# Patient Record
Sex: FEMALE | Race: BLACK OR AFRICAN AMERICAN | NOT HISPANIC OR LATINO | Employment: PART TIME | URBAN - METROPOLITAN AREA
[De-identification: names, ages, dates, MRNs, and addresses within clinical notes are randomized per-mention and may not be internally consistent; named-entity substitution may affect disease eponyms.]

---
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
## 2022-01-28 ENCOUNTER — LAB VISIT (OUTPATIENT)
Dept: LAB | Facility: HOSPITAL | Age: 22
End: 2022-01-28
Attending: NURSE PRACTITIONER
Payer: COMMERCIAL

## 2022-01-28 ENCOUNTER — OFFICE VISIT (OUTPATIENT)
Dept: FAMILY MEDICINE | Facility: CLINIC | Age: 22
End: 2022-01-28
Payer: COMMERCIAL

## 2022-01-28 VITALS
SYSTOLIC BLOOD PRESSURE: 118 MMHG | WEIGHT: 119.94 LBS | OXYGEN SATURATION: 99 % | DIASTOLIC BLOOD PRESSURE: 78 MMHG | HEART RATE: 60 BPM

## 2022-01-28 DIAGNOSIS — R10.32 LEFT LOWER QUADRANT PAIN: ICD-10-CM

## 2022-01-28 LAB
ALBUMIN SERPL BCP-MCNC: 4.2 G/DL (ref 3.5–5.2)
ALP SERPL-CCNC: 40 U/L (ref 55–135)
ALT SERPL W/O P-5'-P-CCNC: 17 U/L (ref 10–44)
AMYLASE SERPL-CCNC: 142 U/L (ref 20–110)
ANION GAP SERPL CALC-SCNC: 9 MMOL/L (ref 8–16)
AST SERPL-CCNC: 32 U/L (ref 10–40)
BASOPHILS # BLD AUTO: 0.02 K/UL (ref 0–0.2)
BASOPHILS NFR BLD: 0.4 % (ref 0–1.9)
BILIRUB SERPL-MCNC: 0.4 MG/DL (ref 0.1–1)
BUN SERPL-MCNC: 13 MG/DL (ref 6–20)
CALCIUM SERPL-MCNC: 9.9 MG/DL (ref 8.7–10.5)
CHLORIDE SERPL-SCNC: 106 MMOL/L (ref 95–110)
CO2 SERPL-SCNC: 22 MMOL/L (ref 23–29)
CREAT SERPL-MCNC: 0.9 MG/DL (ref 0.5–1.4)
DIFFERENTIAL METHOD: NORMAL
EOSINOPHIL # BLD AUTO: 0.1 K/UL (ref 0–0.5)
EOSINOPHIL NFR BLD: 1.2 % (ref 0–8)
ERYTHROCYTE [DISTWIDTH] IN BLOOD BY AUTOMATED COUNT: 11.9 % (ref 11.5–14.5)
EST. GFR  (AFRICAN AMERICAN): >60 ML/MIN/1.73 M^2
EST. GFR  (NON AFRICAN AMERICAN): >60 ML/MIN/1.73 M^2
GLUCOSE SERPL-MCNC: 80 MG/DL (ref 70–110)
HCT VFR BLD AUTO: 42 % (ref 37–48.5)
HGB BLD-MCNC: 13.5 G/DL (ref 12–16)
IMM GRANULOCYTES # BLD AUTO: 0.01 K/UL (ref 0–0.04)
IMM GRANULOCYTES NFR BLD AUTO: 0.2 % (ref 0–0.5)
LIPASE SERPL-CCNC: 29 U/L (ref 4–60)
LYMPHOCYTES # BLD AUTO: 2.3 K/UL (ref 1–4.8)
LYMPHOCYTES NFR BLD: 48 % (ref 18–48)
MCH RBC QN AUTO: 29.3 PG (ref 27–31)
MCHC RBC AUTO-ENTMCNC: 32.1 G/DL (ref 32–36)
MCV RBC AUTO: 91 FL (ref 82–98)
MONOCYTES # BLD AUTO: 0.5 K/UL (ref 0.3–1)
MONOCYTES NFR BLD: 9.4 % (ref 4–15)
NEUTROPHILS # BLD AUTO: 2 K/UL (ref 1.8–7.7)
NEUTROPHILS NFR BLD: 40.8 % (ref 38–73)
NRBC BLD-RTO: 0 /100 WBC
PLATELET # BLD AUTO: 291 K/UL (ref 150–450)
PMV BLD AUTO: 10.7 FL (ref 9.2–12.9)
POTASSIUM SERPL-SCNC: 4.6 MMOL/L (ref 3.5–5.1)
PROT SERPL-MCNC: 8.1 G/DL (ref 6–8.4)
RBC # BLD AUTO: 4.61 M/UL (ref 4–5.4)
SODIUM SERPL-SCNC: 137 MMOL/L (ref 136–145)
WBC # BLD AUTO: 4.88 K/UL (ref 3.9–12.7)

## 2022-01-28 PROCEDURE — 99204 PR OFFICE/OUTPT VISIT, NEW, LEVL IV, 45-59 MIN: ICD-10-PCS | Mod: S$GLB,,, | Performed by: NURSE PRACTITIONER

## 2022-01-28 PROCEDURE — 3074F PR MOST RECENT SYSTOLIC BLOOD PRESSURE < 130 MM HG: ICD-10-PCS | Mod: CPTII,S$GLB,, | Performed by: NURSE PRACTITIONER

## 2022-01-28 PROCEDURE — 3078F PR MOST RECENT DIASTOLIC BLOOD PRESSURE < 80 MM HG: ICD-10-PCS | Mod: CPTII,S$GLB,, | Performed by: NURSE PRACTITIONER

## 2022-01-28 PROCEDURE — 1160F PR REVIEW ALL MEDS BY PRESCRIBER/CLIN PHARMACIST DOCUMENTED: ICD-10-PCS | Mod: CPTII,S$GLB,, | Performed by: NURSE PRACTITIONER

## 2022-01-28 PROCEDURE — 99999 PR PBB SHADOW E&M-NEW PATIENT-LVL III: CPT | Mod: PBBFAC,,, | Performed by: NURSE PRACTITIONER

## 2022-01-28 PROCEDURE — 3078F DIAST BP <80 MM HG: CPT | Mod: CPTII,S$GLB,, | Performed by: NURSE PRACTITIONER

## 2022-01-28 PROCEDURE — 1160F RVW MEDS BY RX/DR IN RCRD: CPT | Mod: CPTII,S$GLB,, | Performed by: NURSE PRACTITIONER

## 2022-01-28 PROCEDURE — 36415 COLL VENOUS BLD VENIPUNCTURE: CPT | Mod: PO | Performed by: NURSE PRACTITIONER

## 2022-01-28 PROCEDURE — 99999 PR PBB SHADOW E&M-NEW PATIENT-LVL III: ICD-10-PCS | Mod: PBBFAC,,, | Performed by: NURSE PRACTITIONER

## 2022-01-28 PROCEDURE — 3074F SYST BP LT 130 MM HG: CPT | Mod: CPTII,S$GLB,, | Performed by: NURSE PRACTITIONER

## 2022-01-28 PROCEDURE — 82150 ASSAY OF AMYLASE: CPT | Performed by: NURSE PRACTITIONER

## 2022-01-28 PROCEDURE — 80053 COMPREHEN METABOLIC PANEL: CPT | Performed by: NURSE PRACTITIONER

## 2022-01-28 PROCEDURE — 1159F PR MEDICATION LIST DOCUMENTED IN MEDICAL RECORD: ICD-10-PCS | Mod: CPTII,S$GLB,, | Performed by: NURSE PRACTITIONER

## 2022-01-28 PROCEDURE — 83690 ASSAY OF LIPASE: CPT | Performed by: NURSE PRACTITIONER

## 2022-01-28 PROCEDURE — 99204 OFFICE O/P NEW MOD 45 MIN: CPT | Mod: S$GLB,,, | Performed by: NURSE PRACTITIONER

## 2022-01-28 PROCEDURE — 85025 COMPLETE CBC W/AUTO DIFF WBC: CPT | Performed by: NURSE PRACTITIONER

## 2022-01-28 PROCEDURE — 1159F MED LIST DOCD IN RCRD: CPT | Mod: CPTII,S$GLB,, | Performed by: NURSE PRACTITIONER

## 2022-01-28 RX ORDER — DESOGESTREL AND ETHINYL ESTRADIOL 21-5 (28)
1 KIT ORAL DAILY
Status: ON HOLD | COMMUNITY
End: 2023-10-17 | Stop reason: CLARIF

## 2022-01-28 NOTE — PROGRESS NOTES
Subjective:       Patient ID: Devan Selby is a 21 y.o. female.    Chief Complaint: Abdominal Pain    HPI   New patient to clinic without significant medical history presents for persistent moderate LUQ and kay (L>R) abdominal pain  No pain currently   Onset roughly 2-3 months ago   Waves of abdominal pain/discomfort lasting hours at a time, occurring a few days to a week at a time then spontaneously resolves  Pain occurs mostly at night  During episodes she will having bloating and nausea   Denies RUQ pain    Cannot identify trigger. Tried cutting gluten, caffeine, meat --no improvement   On OCP cycles normal. No spotting or vaginal complaints   Bowel movements normal. No association with symptoms. Denies dark or bloody stools  Denies regurgitation, heartburn, or belching   Denies urinary symptoms   Denies anxiety or stress    Vitals:    01/28/22 1419   BP: 118/78   Pulse: 60     Review of Systems   Constitutional: Negative for chills, diaphoresis and fever.   HENT: Negative for facial swelling and trouble swallowing.    Respiratory: Negative for cough and shortness of breath.    Cardiovascular: Negative for chest pain.   Gastrointestinal: Positive for abdominal distention, abdominal pain and nausea. Negative for anal bleeding, blood in stool, constipation, diarrhea and vomiting.   Genitourinary: Negative for difficulty urinating, dysuria, frequency, menstrual problem, pelvic pain, vaginal bleeding and vaginal pain.   Musculoskeletal: Negative for gait problem.   Skin: Negative for pallor and rash.   Neurological: Negative for speech difficulty.   Psychiatric/Behavioral: Negative for confusion. The patient is not nervous/anxious.        No past medical history on file.  Objective:      Physical Exam  Vitals and nursing note reviewed.   Constitutional:       General: She is not in acute distress.     Appearance: She is not diaphoretic.   HENT:      Head: Normocephalic.   Eyes:      General:         Right eye: No  discharge.         Left eye: No discharge.   Neck:      Trachea: No tracheal deviation.   Cardiovascular:      Rate and Rhythm: Normal rate.      Heart sounds: Normal heart sounds.   Pulmonary:      Effort: Pulmonary effort is normal.      Breath sounds: Normal breath sounds.   Abdominal:      General: There is no distension.      Palpations: Abdomen is soft.      Tenderness: There is no abdominal tenderness. There is no right CVA tenderness or left CVA tenderness.   Skin:     Coloration: Skin is not pale.   Neurological:      Mental Status: She is alert and oriented to person, place, and time.   Psychiatric:         Speech: Speech normal.         Behavior: Behavior normal.         Thought Content: Thought content normal.         Judgment: Judgment normal.         Assessment:       1. Left lower quadrant pain        Plan:       Left lower quadrant pain  -     CBC Auto Differential; Future; Expected date: 01/28/2022  -     Comprehensive Metabolic Panel; Future; Expected date: 01/28/2022  -     Amylase; Future; Expected date: 01/28/2022  -     Lipase; Future; Expected date: 01/28/2022  -     CT Abdomen Pelvis With Contrast; Future; Expected date: 01/28/2022        FU with GI if no improvement

## 2022-01-30 DIAGNOSIS — R10.32 LEFT LOWER QUADRANT PAIN: Primary | ICD-10-CM

## 2022-01-30 DIAGNOSIS — R74.8 ELEVATED AMYLASE: ICD-10-CM

## 2022-02-02 ENCOUNTER — TELEPHONE (OUTPATIENT)
Dept: FAMILY MEDICINE | Facility: CLINIC | Age: 22
End: 2022-02-02
Payer: COMMERCIAL

## 2022-02-02 NOTE — TELEPHONE ENCOUNTER
Left a message for pt to call back so we can get her scheduled for GI appt        ----- Message from Silke Clark NP sent at 1/30/2022  2:21 PM CST -----  Please schedule GI appointment after CT

## 2022-02-02 NOTE — TELEPHONE ENCOUNTER
Called pt and scheduled an appt with GI on monday 2/21. She can only do Mondays      ----- Message from Silke Clark NP sent at 1/30/2022  2:21 PM CST -----  Please schedule GI appointment after CT

## 2022-02-06 ENCOUNTER — TELEPHONE (OUTPATIENT)
Dept: FAMILY MEDICINE | Facility: CLINIC | Age: 22
End: 2022-02-06
Payer: COMMERCIAL

## 2022-02-06 NOTE — TELEPHONE ENCOUNTER
----- Message from Silke Clark NP sent at 1/30/2022  2:21 PM CST -----  Please schedule GI appointment after CT

## 2022-02-14 ENCOUNTER — OFFICE VISIT (OUTPATIENT)
Dept: GASTROENTEROLOGY | Facility: CLINIC | Age: 22
End: 2022-02-14
Payer: COMMERCIAL

## 2022-02-14 ENCOUNTER — TELEPHONE (OUTPATIENT)
Dept: GASTROENTEROLOGY | Facility: CLINIC | Age: 22
End: 2022-02-14
Payer: COMMERCIAL

## 2022-02-14 ENCOUNTER — HOSPITAL ENCOUNTER (OUTPATIENT)
Dept: RADIOLOGY | Facility: HOSPITAL | Age: 22
Discharge: HOME | End: 2022-02-14
Attending: NURSE PRACTITIONER
Payer: COMMERCIAL

## 2022-02-14 VITALS
HEART RATE: 62 BPM | BODY MASS INDEX: 19.13 KG/M2 | DIASTOLIC BLOOD PRESSURE: 81 MMHG | WEIGHT: 119.06 LBS | SYSTOLIC BLOOD PRESSURE: 120 MMHG | HEIGHT: 66 IN

## 2022-02-14 DIAGNOSIS — R09.A2 GLOBUS SENSATION: ICD-10-CM

## 2022-02-14 DIAGNOSIS — R14.0 BLOATING: ICD-10-CM

## 2022-02-14 DIAGNOSIS — R10.32 LEFT LOWER QUADRANT PAIN: ICD-10-CM

## 2022-02-14 DIAGNOSIS — R10.13 EPIGASTRIC PAIN: ICD-10-CM

## 2022-02-14 DIAGNOSIS — K21.9 GASTROESOPHAGEAL REFLUX DISEASE, UNSPECIFIED WHETHER ESOPHAGITIS PRESENT: Primary | ICD-10-CM

## 2022-02-14 DIAGNOSIS — K57.90 DIVERTICULOSIS: ICD-10-CM

## 2022-02-14 DIAGNOSIS — R11.0 NAUSEA: ICD-10-CM

## 2022-02-14 DIAGNOSIS — R12 HEARTBURN: ICD-10-CM

## 2022-02-14 PROCEDURE — A9698 NON-RAD CONTRAST MATERIALNOC: HCPCS | Mod: PO | Performed by: NURSE PRACTITIONER

## 2022-02-14 PROCEDURE — 3008F PR BODY MASS INDEX (BMI) DOCUMENTED: ICD-10-PCS | Mod: CPTII,S$GLB,,

## 2022-02-14 PROCEDURE — 99204 OFFICE O/P NEW MOD 45 MIN: CPT | Mod: S$GLB,,,

## 2022-02-14 PROCEDURE — 25500020 PHARM REV CODE 255: Mod: PO | Performed by: NURSE PRACTITIONER

## 2022-02-14 PROCEDURE — 74177 CT ABDOMEN PELVIS WITH CONTRAST: ICD-10-PCS | Mod: 26,,, | Performed by: RADIOLOGY

## 2022-02-14 PROCEDURE — 74177 CT ABD & PELVIS W/CONTRAST: CPT | Mod: TC,PO

## 2022-02-14 PROCEDURE — 1160F PR REVIEW ALL MEDS BY PRESCRIBER/CLIN PHARMACIST DOCUMENTED: ICD-10-PCS | Mod: CPTII,S$GLB,,

## 2022-02-14 PROCEDURE — 99999 PR PBB SHADOW E&M-EST. PATIENT-LVL III: CPT | Mod: PBBFAC,,,

## 2022-02-14 PROCEDURE — 74177 CT ABD & PELVIS W/CONTRAST: CPT | Mod: 26,,, | Performed by: RADIOLOGY

## 2022-02-14 PROCEDURE — 3079F PR MOST RECENT DIASTOLIC BLOOD PRESSURE 80-89 MM HG: ICD-10-PCS | Mod: CPTII,S$GLB,,

## 2022-02-14 PROCEDURE — 3079F DIAST BP 80-89 MM HG: CPT | Mod: CPTII,S$GLB,,

## 2022-02-14 PROCEDURE — 1159F PR MEDICATION LIST DOCUMENTED IN MEDICAL RECORD: ICD-10-PCS | Mod: CPTII,S$GLB,,

## 2022-02-14 PROCEDURE — 99204 PR OFFICE/OUTPT VISIT, NEW, LEVL IV, 45-59 MIN: ICD-10-PCS | Mod: S$GLB,,,

## 2022-02-14 PROCEDURE — 3074F SYST BP LT 130 MM HG: CPT | Mod: CPTII,S$GLB,,

## 2022-02-14 PROCEDURE — 3008F BODY MASS INDEX DOCD: CPT | Mod: CPTII,S$GLB,,

## 2022-02-14 PROCEDURE — 1159F MED LIST DOCD IN RCRD: CPT | Mod: CPTII,S$GLB,,

## 2022-02-14 PROCEDURE — 99999 PR PBB SHADOW E&M-EST. PATIENT-LVL III: ICD-10-PCS | Mod: PBBFAC,,,

## 2022-02-14 PROCEDURE — 1160F RVW MEDS BY RX/DR IN RCRD: CPT | Mod: CPTII,S$GLB,,

## 2022-02-14 PROCEDURE — 3074F PR MOST RECENT SYSTOLIC BLOOD PRESSURE < 130 MM HG: ICD-10-PCS | Mod: CPTII,S$GLB,,

## 2022-02-14 RX ORDER — OMEPRAZOLE 40 MG/1
40 CAPSULE, DELAYED RELEASE ORAL DAILY
Qty: 30 CAPSULE | Refills: 1 | Status: SHIPPED | OUTPATIENT
Start: 2022-02-14 | End: 2022-06-06 | Stop reason: DRUGHIGH

## 2022-02-14 RX ADMIN — IOHEXOL 1000 ML: 9 SOLUTION ORAL at 11:02

## 2022-02-14 RX ADMIN — IOHEXOL 75 ML: 350 INJECTION, SOLUTION INTRAVENOUS at 11:02

## 2022-02-14 NOTE — PATIENT INSTRUCTIONS
Patient Education       Upper GI Endoscopy   Why is this procedure done?   This procedure is done to view your upper gastrointestinal (GI) tract. This includes your throat and food pipe (esophagus). It also includes your stomach and the first part of the small bowel. Some people have this test for problems like coughing or throwing up blood. Other people may be having bad belly pain or blood in their stool. You may be having trouble swallowing or problems with acid reflux.  Doctors often use this test to look for problems like:  · Ulcers  · Cancer or tumor growths  · Internal bleeding  · Swelling  · Inflammation  · Infection  · Mustafa's esophagus  · Gastroesophageal reflux disease or GERD  · Swallowing problems     What will the results be?   Your doctor may find the problem inside your body that is causing your signs. The doctor can also treat some problems while doing this procedure. This may include things like stopping bleeding or removing a growth.  What happens before the procedure?   Your doctor will take your history and do an exam. Talk to the doctor about:  · All the drugs you are taking. Be sure to include all prescription, over the counter, vitamins, and herbal supplements. Bring a list of drugs you take with you.  · Tell the doctor if you have any drug allergy.  · Any bleeding problems. Be sure to tell your doctor if you are taking any drugs that may cause bleeding. Some of these are warfarin, rivaroxaban, apixaban, ticagrelor, clopidogrel, ketorolac, ibuprofen, naproxen, or aspirin. Certain vitamins and herbs, such as garlic and fish oil, may also add to the risk for bleeding. You may need to stop these drugs as well. Talk to your doctor about them.  · When you need to stop eating or drinking before your procedure.  You will not be allowed to drive right away after the procedure. Ask a family member or a friend to drive you home.  What happens during the procedure?   · Once you are in the operating  room, the staff will put an IV in your arm to give you fluids and drugs. You will be given a drug to make you sleepy. It will also help you stay pain free during the surgery.  · Your doctor may spray a drug in your throat to numb the area.  · You will be asked to lie on your left side. The staff may put a small tube in your nose to help you breathe. Your doctor may place a tool in your mouth to keep it open during the procedure. The staff may place a suction tool in your mouth to lessen saliva flow.  · The doctor will put a special scope in your mouth and down your food pipe. It is a long, thin tube with lights and a small camera. It sends images to a screen in the operating room where the camera is being used.  · To be able to view the site clearly, gas will be pumped into your belly.  · Your doctor will use the scope to see if there are problems in your upper GI tract. Small tools may be used with the scope to fix any problems that are found. Your doctor may stop an area of bleeding or take out a tumor. The doctor may also remove a growth or take tissue samples for biopsy.  · This procedure takes about 15 to 30 minutes.  What happens after the procedure?   · You will go to the Recovery Room and the staff will watch you closely.  · You will be allowed to go home when you are awake and able to eat and drink.  · You may feel bloated after the procedure. This is from any gas the doctor may have used to help see your GI tract better.  · You may have a sore throat after the procedure. You can drink fluid once the numbing drugs in your throat wear off.  · Ask your doctor when the results will be available. Set up a visit to talk about them.  What drugs may be needed?   The doctor may order drugs to:  · Help with pain  · Decrease the acid in your stomach  What problems could happen?   · Painful swallowing  · Upset stomach  · Injury to food pipe   · Throwing up  · Tear in the esophagus  Where can I learn more?   American  "College of Gastroenterology  https://gi.org/topics/upper-gi-endoscopy-egd/   Last Reviewed Date   2021-10-05  Consumer Information Use and Disclaimer   This information is not specific medical advice and does not replace information you receive from your health care provider. This is only a brief summary of general information. It does NOT include all information about conditions, illnesses, injuries, tests, procedures, treatments, therapies, discharge instructions or life-style choices that may apply to you. You must talk with your health care provider for complete information about your health and treatment options. This information should not be used to decide whether or not to accept your health care providers advice, instructions or recommendations. Only your health care provider has the knowledge and training to provide advice that is right for you.  Copyright   Copyright © 2021 UpToDate, Inc. and its affiliates and/or licensors. All rights reserved.  Patient Education       Acid Reflux and Gastroesophageal Reflux Disease in Adults   The Basics   Written by the doctors and editors at [employer]   What is acid reflux? -- Acid reflux is when the acid that is normally in your stomach backs up into the esophagus. The esophagus is the tube that carries food from your mouth to your stomach (figure 1).  When acid reflux causes bothersome symptoms or damage, doctors call it "gastroesophageal reflux disease" or "GERD."  What are the symptoms of acid reflux? -- The most common symptoms are:  · Heartburn, which is a burning feeling in the chest  · Regurgitation, which is when acid and undigested food flow back into your throat or mouth   Other symptoms might include:  · Stomach or chest pain  · Trouble swallowing  · Having a raspy voice or a sore throat  · Unexplained cough  · Nausea or vomiting  Is there anything I can do on my own to feel better? -- Yes. You might feel better if you:  · Lose weight (if you are " overweight)  · Raise the head of your bed by 6 to 8 inches - You can do this by putting blocks of wood or rubber under 2 legs of the bed or a foam wedge under the mattress.  · Avoid foods that make your symptoms worse - For some people these include coffee, chocolate, alcohol, peppermint, and fatty foods.  · Stop smoking, if you smoke  · Avoid late meals - Lying down with a full stomach can make reflux worse. Try to plan meals for at least 2 to 3 hours before bedtime.  · Avoid tight clothing - Some people feel better if they wear comfortable clothing that does not squeeze the stomach area.   How is acid reflux treated? -- There are a few main types of medicines that can help with the symptoms of acid reflux. The most common are antacids, histamine blockers, and proton pump inhibitors (table 1). All of these medicines work by reducing or blocking stomach acid. But they each do that in a different way.  · For mild symptoms, antacids can help, but they work only for a short time. Histamine blockers are stronger and last longer than antacids. You can buy antacids and most histamine blockers without a prescription.  · For frequent and more severe symptoms, proton pump inhibitors are the most effective medicines. Some of these medicines are sold without a prescription. But there are other versions that your doctor can prescribe.  Sometimes, medicines cost less if you get them with a doctor's prescription. Other times, non-prescription medicines cost less. If you are worried about cost, ask your pharmacist about ways to pay less for your medicines.  Should I see a doctor or nurse about my acid reflux? -- Some people can manage their acid reflux on their own by changing their habits or taking non-prescription medicines. But you should see a doctor or nurse if:  · Your symptoms are severe or last a long time  · You cannot seem to control your symptoms  · You have had symptoms for many years  You should also see a doctor or  "nurse right away if you:  · Have trouble swallowing, or feel as though food gets "stuck" on the way down  · Lose weight when you are not trying to  · Have chest pain  · Choke when you eat  · Vomit blood or have bowel movements that are red, black, or look like tar  What if my child or teenager has acid reflux? -- If your child or teenager has acid reflux, take him or her to see a doctor or nurse. Do not give your child medicines to treat acid reflux without talking to a doctor or nurse.  In children, acid reflux can be caused by a number of problems. It's important to have a doctor or nurse check for these problems before trying any treatments.  All topics are updated as new evidence becomes available and our peer review process is complete.  This topic retrieved from [employer] on: Sep 21, 2021.  Topic 39175 Version 11.0  Release: 29.4.2 - C29.263  © 2021 UpToDate, Inc. and/or its affiliates. All rights reserved.  figure 1: Upper digestive tract     The upper digestive tract includes the esophagus (the tube that connects the mouth to the stomach), the stomach, and the duodenum (the first part of the small intestine).  Graphic 70034 Version 6.0    table 1: Medicines used to reduce stomach acid  Medicine type  Medicine name examples    Antacids* Calcium carbonate (sample brand names: Maalox, Tums)    Aluminum hydroxide, magnesium hydroxide, and simethicone (sample brand name: Mylanta)   Surface agents Sucralfate (brand name: Carafate)   Histamine blockers¶  Famotidine (brand name: Pepcid)    Cimetidine (brand name: Tagamet)   Proton pump inhibitors Omeprazole (brand name: Prilosec)    Esomeprazole (brand name: Nexium)    Pantoprazole (brand name: Protonix)    Lansoprazole (brand name: Prevacid)    Dexlansoprazole (brand name: Dexilant)    Rabeprazole (brand name: AcipHex)   Graphic 47604 Version 14.0  Consumer Information Use and Disclaimer   This information is not specific medical advice and does not replace " information you receive from your health care provider. This is only a brief summary of general information. It does NOT include all information about conditions, illnesses, injuries, tests, procedures, treatments, therapies, discharge instructions or life-style choices that may apply to you. You must talk with your health care provider for complete information about your health and treatment options. This information should not be used to decide whether or not to accept your health care provider's advice, instructions or recommendations. Only your health care provider has the knowledge and training to provide advice that is right for you. The use of this information is governed by the [employer] End User License Agreement, available at https://www.[employer]/en/solutions/[employer]/about/maty.The use of [employer] content is governed by the [employer] Terms of Use. ©2021 [employer] All rights reserved.  Copyright   © 2021 UpToDate, Inc. and/or its affiliates. All rights reserved.

## 2022-02-14 NOTE — TELEPHONE ENCOUNTER
----- Message from Frances Cameron sent at 2/14/2022 11:53 AM CST -----  Type: Needs Medical Advice  Who Called:  Pt Mother  Best Call Back Number: [phone]  Additional Information: Pt has appt on 02/21 but is in pain and in town today and requesting to be seen today--Please advise--thank you

## 2022-02-14 NOTE — H&P (VIEW-ONLY)
Subjective:       Patient ID: Devan Selby is a 21 y.o. female Body mass index is 19.21 kg/m².    Chief Complaint: Abdominal Pain    This patient is new to me.     Abdominal Pain  This is a recurrent problem. Episode onset: started around Thanksgiving 2021. The onset quality is undetermined. The problem occurs every several days. The problem has been waxing and waning. The pain is located in the epigastric region. The pain is at a severity of 0/10 (rated pain 6-7/10 during episodes). The quality of the pain is dull and sharp. The abdominal pain radiates to the LLQ. Associated symptoms include nausea (mild relief after taking OTC TUMs). Pertinent negatives include no anorexia, belching, constipation, diarrhea, fever, flatus, frequency, headaches, hematochezia, hematuria, melena, vomiting or weight loss. Associated symptoms comments: Also reports bloating. The pain is aggravated by eating and certain positions (worse after eat fried foods in the past; laying on left side worsens symptoms; reports changing diet and avoiding fried foods, gluten, or triggering foods did not improve symptoms). She has tried antacids (past treatment: OTC TUMs PRN) for the symptoms. The treatment provided mild relief. Her past medical history is significant for GERD (reports intermittent episodes of globus sensation and heartburn 3 times a week). There is no history of abdominal surgery, colon cancer, Crohn's disease, gallstones, irritable bowel syndrome, pancreatitis, PUD or ulcerative colitis. Patient's medical history does not include kidney stones and UTI.     Review of Systems   Constitutional: Negative for activity change, appetite change, chills, diaphoresis, fatigue, fever, unexpected weight change and weight loss.   HENT: Negative for sore throat and trouble swallowing.    Respiratory: Negative for cough, choking and shortness of breath.    Cardiovascular: Negative for chest pain.   Gastrointestinal: Positive for abdominal  distention, abdominal pain and nausea (mild relief after taking OTC TUMs). Negative for anal bleeding, anorexia, blood in stool, constipation, diarrhea, flatus, hematochezia, melena, rectal pain and vomiting.   Genitourinary: Negative for frequency and hematuria.   Neurological: Negative for headaches.       Patient's last menstrual period was 02/14/2022.  History reviewed. No pertinent past medical history.  History reviewed. No pertinent surgical history.  Family History   Problem Relation Age of Onset    Colon cancer Neg Hx     Colon polyps Neg Hx     Crohn's disease Neg Hx     Ulcerative colitis Neg Hx     Stomach cancer Neg Hx     Rectal cancer Neg Hx      Social History     Tobacco Use    Smoking status: Never Smoker    Smokeless tobacco: Never Used   Substance Use Topics    Alcohol use: No    Drug use: No     Wt Readings from Last 10 Encounters:   02/14/22 54 kg (119 lb 0.8 oz)   01/28/22 54.4 kg (119 lb 14.9 oz)   04/09/19 54.4 kg (120 lb) (43 %, Z= -0.18)*   02/25/19 54.4 kg (120 lb) (43 %, Z= -0.17)*     * Growth percentiles are based on CDC (Girls, 2-20 Years) data.     Lab Results   Component Value Date    WBC 4.88 01/28/2022    HGB 13.5 01/28/2022    HCT 42.0 01/28/2022    MCV 91 01/28/2022     01/28/2022     CMP  Sodium   Date Value Ref Range Status   01/28/2022 137 136 - 145 mmol/L Final     Potassium   Date Value Ref Range Status   01/28/2022 4.6 3.5 - 5.1 mmol/L Final     Chloride   Date Value Ref Range Status   01/28/2022 106 95 - 110 mmol/L Final     CO2   Date Value Ref Range Status   01/28/2022 22 (L) 23 - 29 mmol/L Final     Glucose   Date Value Ref Range Status   01/28/2022 80 70 - 110 mg/dL Final     BUN   Date Value Ref Range Status   01/28/2022 13 6 - 20 mg/dL Final     Creatinine   Date Value Ref Range Status   01/28/2022 0.9 0.5 - 1.4 mg/dL Final     Calcium   Date Value Ref Range Status   01/28/2022 9.9 8.7 - 10.5 mg/dL Final     Total Protein   Date Value Ref Range  Status   01/28/2022 8.1 6.0 - 8.4 g/dL Final     Albumin   Date Value Ref Range Status   01/28/2022 4.2 3.5 - 5.2 g/dL Final     Total Bilirubin   Date Value Ref Range Status   01/28/2022 0.4 0.1 - 1.0 mg/dL Final     Comment:     For infants and newborns, interpretation of results should be based  on gestational age, weight and in agreement with clinical  observations.    Premature Infant recommended reference ranges:  Up to 24 hours.............<8.0 mg/dL  Up to 48 hours............<12.0 mg/dL  3-5 days..................<15.0 mg/dL  6-29 days.................<15.0 mg/dL       Alkaline Phosphatase   Date Value Ref Range Status   01/28/2022 40 (L) 55 - 135 U/L Final     AST   Date Value Ref Range Status   01/28/2022 32 10 - 40 U/L Final     ALT   Date Value Ref Range Status   01/28/2022 17 10 - 44 U/L Final     Anion Gap   Date Value Ref Range Status   01/28/2022 9 8 - 16 mmol/L Final     eGFR if    Date Value Ref Range Status   01/28/2022 >60.0 >60 mL/min/1.73 m^2 Final     eGFR if non    Date Value Ref Range Status   01/28/2022 >60.0 >60 mL/min/1.73 m^2 Final     Comment:     Calculation used to obtain the estimated glomerular filtration  rate (eGFR) is the CKD-EPI equation.        Lab Results   Component Value Date    AMYLASE 142 (H) 01/28/2022     Lab Results   Component Value Date    LIPASE 29 01/28/2022     Reviewed prior medical records including radiology report of abdominal CT 02/14/2022.    Objective:      Physical Exam  Vitals and nursing note reviewed.   Constitutional:       General: She is not in acute distress.     Appearance: Normal appearance. She is normal weight. She is not ill-appearing.   HENT:      Mouth/Throat:      Comments: Unable to assess due to COVID-19 concerns.  Eyes:      Extraocular Movements: Extraocular movements intact.      Pupils: Pupils are equal, round, and reactive to light.   Cardiovascular:      Rate and Rhythm: Normal rate and regular rhythm.    Pulmonary:      Effort: Pulmonary effort is normal. No respiratory distress.      Breath sounds: Normal breath sounds.   Abdominal:      General: Abdomen is flat. Bowel sounds are normal. There is no distension. There are no signs of injury.      Palpations: Abdomen is soft. There is no shifting dullness, fluid wave, hepatomegaly or mass.      Tenderness: There is no abdominal tenderness. There is no guarding or rebound. Negative signs include No's sign and McBurney's sign.      Hernia: No hernia is present.   Skin:     General: Skin is warm and dry.      Coloration: Skin is not jaundiced.   Neurological:      Mental Status: She is alert and oriented to person, place, and time.   Psychiatric:         Mood and Affect: Mood normal.         Behavior: Behavior normal.         Assessment:       1. Gastroesophageal reflux disease, unspecified whether esophagitis present    2. Heartburn    3. Epigastric pain    4. Nausea    5. Globus sensation    6. Bloating    7. Diverticulosis        Plan:       Gastroesophageal reflux disease, unspecified whether esophagitis present  - schedule EGD, discussed procedure with patient, including risks and benefits, patient verbalized understanding  -discussed about the different types of medications used to treat reflux and how to use them, antacids can be used PRN for breakthrough heartburn symptoms by reducing stomach acid that is already produced, H2 blockers work by limiting the amount acid production, & PPI's work to block acid production and are taken daily, patient verbalized understanding.  -Educated patient on lifestyle modifications to help control/reduce reflux/abdominal pain including: avoid large meals, avoid eating within 2-3 hours of bedtime (avoid late night eating & lying down soon after eating), elevate head of bed if nocturnal symptoms are present, smoking cessation (if current smoker), & weight loss (if overweight).   -Educated to avoid known foods which trigger  reflux symptoms & to minimize/avoid high-fat foods, chocolate, caffeine, citrus, alcohol, & tomato products.  -Advised to avoid/limit use of NSAID's, since they can cause GI upset, bleeding, and/or ulcers. If needed, take with food.   - START: omeprazole (PRILOSEC) 40 MG capsule; Take 1 capsule (40 mg total) by mouth once daily.  Dispense: 30 capsule; Refill: 1    Heartburn  - schedule EGD, discussed procedure with patient, including risks and benefits, patient verbalized understanding  -discussed about the different types of medications used to treat reflux and how to use them, antacids can be used PRN for breakthrough heartburn symptoms by reducing stomach acid that is already produced, H2 blockers work by limiting the amount acid production, & PPI's work to block acid production and are taken daily, patient verbalized understanding.  -Educated patient on lifestyle modifications to help control/reduce reflux/abdominal pain including: avoid large meals, avoid eating within 2-3 hours of bedtime (avoid late night eating & lying down soon after eating), elevate head of bed if nocturnal symptoms are present, smoking cessation (if current smoker), & weight loss (if overweight).   -Educated to avoid known foods which trigger reflux symptoms & to minimize/avoid high-fat foods, chocolate, caffeine, citrus, alcohol, & tomato products.  -Advised to avoid/limit use of NSAID's, since they can cause GI upset, bleeding, and/or ulcers. If needed, take with food.   - START: omeprazole (PRILOSEC) 40 MG capsule; Take 1 capsule (40 mg total) by mouth once daily.  Dispense: 30 capsule; Refill: 1    Epigastric pain  - schedule EGD, discussed procedure with patient, including risks and benefits, patient verbalized understanding    Nausea  - schedule EGD, discussed procedure with patient, including risks and benefits, patient verbalized understanding  - START: omeprazole (PRILOSEC) 40 MG capsule; Take 1 capsule (40 mg total) by mouth  once daily.  Dispense: 30 capsule; Refill: 1    Globus sensation  - schedule EGD, discussed procedure with patient, including risks and benefits, patient verbalized understanding    Bloating  - schedule EGD, discussed procedure with patient, including risks and benefits, patient verbalized understanding  -testing for H. Pylori typically performed during EGD (if not can do lab testing), patient verbalized understanding and agreed with management plan    Diverticulosis  -Consider colonoscopy pending results    Follow up in about 4 weeks (around 3/14/2022), or if symptoms worsen or fail to improve.      If no improvement in symptoms or symptoms worsen, call/follow-up at clinic or go to ER.        30 minutes of total time spent on the encounter, which includes face to face time and non-face to face time preparing to see the patient (eg, review of tests), Obtaining and/or reviewing separately obtained history, Documenting clinical information in the electronic or other health record, Independently interpreting results (not separately reported) and communicating results to the patient/family/caregiver, or Care coordination (not separately reported).

## 2022-02-14 NOTE — TELEPHONE ENCOUNTER
Spoke with pt's mom. Pt is in town today from [facility] having abd pain . Mom wanted pt to be seen today. Mom did not feel pt needed to go to ER at this point. Office visit scheduled. Mom verbalized understanding.

## 2022-02-16 ENCOUNTER — TELEPHONE (OUTPATIENT)
Dept: GASTROENTEROLOGY | Facility: CLINIC | Age: 22
End: 2022-02-16
Payer: COMMERCIAL

## 2022-02-16 NOTE — TELEPHONE ENCOUNTER
----- Message from Aurea Doherty sent at 2/16/2022 12:50 PM CST -----  Contact: patient  Type: Needs Medical Advice  Who Called:  Ingrid Selby (Mother)  Best Call Back Number: [phone] (home)   Additional Information: mom is requesting to speak to someone about rescheduling the procedure.

## 2022-02-16 NOTE — TELEPHONE ENCOUNTER
Patients mom wanted EGD moved up sooner she is still having a lot of epigastric pain. Patient r/s to 3/18 at 1230pm arrive for 1130am. Patient is at [facility] she will try to get covid up there or will do a rapid day of at the hospital.

## 2022-02-18 ENCOUNTER — ANESTHESIA EVENT (OUTPATIENT)
Dept: ENDOSCOPY | Facility: HOSPITAL | Age: 22
End: 2022-02-18
Payer: COMMERCIAL

## 2022-02-18 ENCOUNTER — HOSPITAL ENCOUNTER (OUTPATIENT)
Facility: HOSPITAL | Age: 22
Discharge: HOME | End: 2022-02-18
Attending: INTERNAL MEDICINE | Admitting: INTERNAL MEDICINE
Payer: COMMERCIAL

## 2022-02-18 ENCOUNTER — ANESTHESIA (OUTPATIENT)
Dept: ENDOSCOPY | Facility: HOSPITAL | Age: 22
End: 2022-02-18
Payer: COMMERCIAL

## 2022-02-18 VITALS
OXYGEN SATURATION: 100 % | SYSTOLIC BLOOD PRESSURE: 134 MMHG | WEIGHT: 120 LBS | TEMPERATURE: 98 F | HEIGHT: 66 IN | HEART RATE: 64 BPM | DIASTOLIC BLOOD PRESSURE: 83 MMHG | BODY MASS INDEX: 19.29 KG/M2 | RESPIRATION RATE: 18 BRPM

## 2022-02-18 DIAGNOSIS — K21.9 GASTROESOPHAGEAL REFLUX: ICD-10-CM

## 2022-02-18 DIAGNOSIS — K22.2 SCHATZKI'S RING: Primary | ICD-10-CM

## 2022-02-18 DIAGNOSIS — K44.9 HIATAL HERNIA: ICD-10-CM

## 2022-02-18 DIAGNOSIS — K29.70 GASTRITIS, PRESENCE OF BLEEDING UNSPECIFIED, UNSPECIFIED CHRONICITY, UNSPECIFIED GASTRITIS TYPE: ICD-10-CM

## 2022-02-18 LAB
B-HCG UR QL: NEGATIVE
CTP QC/QA: YES
CTP QC/QA: YES
SARS-COV-2 AG RESP QL IA.RAPID: NEGATIVE

## 2022-02-18 PROCEDURE — 37000008 HC ANESTHESIA 1ST 15 MINUTES: Performed by: INTERNAL MEDICINE

## 2022-02-18 PROCEDURE — 88342 IMHCHEM/IMCYTCHM 1ST ANTB: CPT | Performed by: PATHOLOGY

## 2022-02-18 PROCEDURE — 88305 TISSUE EXAM BY PATHOLOGIST: ICD-10-PCS | Mod: 26,,, | Performed by: PATHOLOGY

## 2022-02-18 PROCEDURE — 88342 IMHCHEM/IMCYTCHM 1ST ANTB: CPT | Mod: 26,,, | Performed by: PATHOLOGY

## 2022-02-18 PROCEDURE — D9220A PRA ANESTHESIA: ICD-10-PCS | Mod: CRNA,,, | Performed by: NURSE ANESTHETIST, CERTIFIED REGISTERED

## 2022-02-18 PROCEDURE — D9220A PRA ANESTHESIA: Mod: CRNA,,, | Performed by: NURSE ANESTHETIST, CERTIFIED REGISTERED

## 2022-02-18 PROCEDURE — 43248 PR EGD, FLEX, W/DILATION OVER GUIDEWIRE: ICD-10-PCS | Mod: ,,, | Performed by: INTERNAL MEDICINE

## 2022-02-18 PROCEDURE — 63600175 PHARM REV CODE 636 W HCPCS: Performed by: INTERNAL MEDICINE

## 2022-02-18 PROCEDURE — 25000003 PHARM REV CODE 250: Performed by: NURSE ANESTHETIST, CERTIFIED REGISTERED

## 2022-02-18 PROCEDURE — 43239 EGD BIOPSY SINGLE/MULTIPLE: CPT | Mod: 59,,, | Performed by: INTERNAL MEDICINE

## 2022-02-18 PROCEDURE — C1769 GUIDE WIRE: HCPCS | Performed by: INTERNAL MEDICINE

## 2022-02-18 PROCEDURE — 88305 TISSUE EXAM BY PATHOLOGIST: CPT | Mod: 26,,, | Performed by: PATHOLOGY

## 2022-02-18 PROCEDURE — 25000003 PHARM REV CODE 250: Performed by: INTERNAL MEDICINE

## 2022-02-18 PROCEDURE — D9220A PRA ANESTHESIA: Mod: ANES,,, | Performed by: ANESTHESIOLOGY

## 2022-02-18 PROCEDURE — 37000009 HC ANESTHESIA EA ADD 15 MINS: Performed by: INTERNAL MEDICINE

## 2022-02-18 PROCEDURE — D9220A PRA ANESTHESIA: ICD-10-PCS | Mod: ANES,,, | Performed by: ANESTHESIOLOGY

## 2022-02-18 PROCEDURE — 81025 URINE PREGNANCY TEST: CPT | Performed by: INTERNAL MEDICINE

## 2022-02-18 PROCEDURE — 88305 TISSUE EXAM BY PATHOLOGIST: CPT | Performed by: PATHOLOGY

## 2022-02-18 PROCEDURE — 88342 CHG IMMUNOCYTOCHEMISTRY: ICD-10-PCS | Mod: 26,,, | Performed by: PATHOLOGY

## 2022-02-18 PROCEDURE — 27201012 HC FORCEPS, HOT/COLD, DISP: Performed by: INTERNAL MEDICINE

## 2022-02-18 PROCEDURE — 43248 EGD GUIDE WIRE INSERTION: CPT | Performed by: INTERNAL MEDICINE

## 2022-02-18 PROCEDURE — 63600175 PHARM REV CODE 636 W HCPCS: Performed by: NURSE ANESTHETIST, CERTIFIED REGISTERED

## 2022-02-18 PROCEDURE — 43239 PR EGD, FLEX, W/BIOPSY, SGL/MULTI: ICD-10-PCS | Mod: 59,,, | Performed by: INTERNAL MEDICINE

## 2022-02-18 PROCEDURE — 43248 EGD GUIDE WIRE INSERTION: CPT | Mod: ,,, | Performed by: INTERNAL MEDICINE

## 2022-02-18 PROCEDURE — 43239 EGD BIOPSY SINGLE/MULTIPLE: CPT | Mod: 59 | Performed by: INTERNAL MEDICINE

## 2022-02-18 RX ORDER — ONDANSETRON 2 MG/ML
8 INJECTION INTRAMUSCULAR; INTRAVENOUS ONCE
Status: COMPLETED | OUTPATIENT
Start: 2022-02-18 | End: 2022-02-18

## 2022-02-18 RX ORDER — LIDOCAINE HCL/PF 100 MG/5ML
SYRINGE (ML) INTRAVENOUS
Status: DISCONTINUED | OUTPATIENT
Start: 2022-02-18 | End: 2022-02-18

## 2022-02-18 RX ORDER — SODIUM CHLORIDE 9 MG/ML
INJECTION, SOLUTION INTRAVENOUS CONTINUOUS
Status: DISCONTINUED | OUTPATIENT
Start: 2022-02-18 | End: 2022-02-18 | Stop reason: HOSPADM

## 2022-02-18 RX ORDER — PROPOFOL 10 MG/ML
VIAL (ML) INTRAVENOUS
Status: DISCONTINUED | OUTPATIENT
Start: 2022-02-18 | End: 2022-02-18

## 2022-02-18 RX ADMIN — ONDANSETRON 8 MG: 2 INJECTION INTRAMUSCULAR; INTRAVENOUS at 01:02

## 2022-02-18 RX ADMIN — PROPOFOL 100 MG: 10 INJECTION, EMULSION INTRAVENOUS at 01:02

## 2022-02-18 RX ADMIN — PROPOFOL 100 MG: 10 INJECTION, EMULSION INTRAVENOUS at 12:02

## 2022-02-18 RX ADMIN — SODIUM CHLORIDE: 0.9 INJECTION, SOLUTION INTRAVENOUS at 12:02

## 2022-02-18 RX ADMIN — LIDOCAINE HYDROCHLORIDE 100 MG: 20 INJECTION INTRAVENOUS at 12:02

## 2022-02-18 NOTE — ANESTHESIA POSTPROCEDURE EVALUATION
Anesthesia Post Evaluation    Patient: Devan Selby    Procedure(s) Performed: Procedure(s) (LRB):  EGD (ESOPHAGOGASTRODUODENOSCOPY) (N/A)    Final Anesthesia Type: general      Patient location during evaluation: PACU  Patient participation: Yes- Able to Participate  Level of consciousness: awake and alert and oriented  Post-procedure vital signs: reviewed and stable  Pain management: adequate  Airway patency: patent    PONV status at discharge: No PONV  Anesthetic complications: no      Cardiovascular status: blood pressure returned to baseline and stable  Respiratory status: unassisted and spontaneous ventilation  Hydration status: euvolemic  Follow-up not needed.          Vitals Value Taken Time   /72 02/18/22 1323   Temp   02/18/22 1324   Pulse 72 02/18/22 1323   Resp 18 02/18/22 1306   SpO2 100 % 02/18/22 1323   Vitals shown include unvalidated device data.      No case tracking events are documented in the log.      Pain/Angela Score: Angela Score: 6 (2/18/2022  1:15 PM)

## 2022-02-18 NOTE — DISCHARGE INSTRUCTIONS
Patient Education       Esophageal Dilation   Why is this procedure done?   Esophageal dilation is a procedure used to dilate or stretch a narrow place in the esophagus. The esophagus is a tube that food moves through to get from your mouth to your stomach. When the esophagus narrows, this condition is called esophageal stricture.  What will the results be?   The narrowed portion of the esophagus will be stretched. This will make swallowing food and liquids easier.  What happens before the procedure?   · Your doctor will take your history and do an exam. Talk to your doctor about:  ? All the drugs you are taking. Be sure to include all prescription and over-the-counter (OTC) drugs, and herbal supplements. Tell the doctor about any drug allergy. Bring a list of drugs you take with you.  ? Any bleeding problems. Be sure to tell your doctor if you are taking any drugs that may cause bleeding. Some of these are warfarin, rivaroxaban, apixaban, ticagrelor, clopidogrel, ketorolac, ibuprofen, naproxen, or aspirin. Certain vitamins and herbs, such as garlic and fish oil, may also add to the risk for bleeding. You may need to stop these drugs as well. Talk to your doctor about them.  ? If you need to stop eating or drinking before your procedure.  · You will not be allowed to drive right away after the procedure. Ask a family member or a friend to drive you home.  What happens during the procedure?   · You will lie on the exam table. Your blood pressure and heart rate will be closely checked during the procedure.  · Your doctor may put an IV in your arm to give you fluids and drugs. You may be asleep or awake during the procedure.  · The doctor will spray a numbing drug into your throat. This will help you stay pain free and comfortable during the procedure.  · A flexible tube with a tiny camera inside may be put in through your mouth and down into your esophagus. You may feel some pressure on your throat as it passes.  · A  special balloon or soft dilators are used to stretch the narrowed part of your esophagus.  · X-rays will be done after the stretching to see if the esophagus is wide enough.  · The procedure takes about 30 minutes.  What happens after the procedure?   · You will go to the Recovery Room for 1 to 3 hours. Your doctor will tell you when you can go home.  · Your throat will be sore. Your doctor will give you drugs for this.  What care is needed at home?   · Ask your doctor what you need to do when you go home. Make sure you ask questions if you do not understand what the doctor says. This way you will know what you need to do.  · It will be painful to swallow for a few days. Your throat will also be sore. Suck on ice chips to help ease throat pain.  · Soft foods like soups and pureed fruits and vegetables will be easier to eat at first. Cold or warm liquids may help soothe your sore throat. Do not start eating solid foods until your doctor tells you it is safe to.  · Your doctor may tell you to use a saline mouth rinse. Use this after meals or when needed. Mix 1/2 teaspoon (2.5 grams) of salt in 1 cup (240 mL) of water.  · Rest for the rest of the day. Ask your doctor when you are able to go back to your normal activities.  What follow-up care is needed?   · Your doctor may ask you to make visits to the office to check on your progress. Be sure to keep these visits.  · Your doctor may send you to a dietitian to help with your diet.   What lifestyle changes are needed?   · Stay away from foods that may bother your throat, such as spicy or sour foods.  · Prevent heartburn by eating small meals more often. Avoid eating too much in one meal.  What problems could happen?   · Scarring of the esophagus  · Esophagus may have a hole or tear in it  · Narrowing may return  When do I need to call the doctor?   · Signs of infection. These include a fever of 100.4°F (38°C) or higher, chills.  · Throat pain not relieved by  drugs  · Throwing up with or without blood  · Problems keeping any food or liquids down  · You are not feeling better in 2 to 3 days or you are feeling worse  · Trouble breathing  · Chest pain  · Bright red stools or black tarry stools  Last Reviewed Date   2021-05-06  Consumer Information Use and Disclaimer   This information is not specific medical advice and does not replace information you receive from your health care provider. This is only a brief summary of general information. It does NOT include all information about conditions, illnesses, injuries, tests, procedures, treatments, therapies, discharge instructions or life-style choices that may apply to you. You must talk with your health care provider for complete information about your health and treatment options. This information should not be used to decide whether or not to accept your health care providers advice, instructions or recommendations. Only your health care provider has the knowledge and training to provide advice that is right for you.  Copyright   Copyright © 2021 [employer] and its affiliates and/or licensors. All rights reserved.  Patient Education       Hiatal Hernia Discharge Instructions   About this topic   Hiatal hernia is when part of the stomach is up in the chest. Normally, the stomach sits below the diaphragm, the muscle that divides the chest and abdomen. The diaphragm has a small opening in it to let the esophagus, or swallowing tube, pass through. With a hiatal hernia, the stomach pushes up through that hole too.     What care is needed at home?   · Ask your doctor what you need to do when you go home. Make sure you ask questions if you do not understand what the doctor says. This way you will know what you need to do.  · Do not wear tight clothing over your belly. Wear clothes and belts that are loose around your waist.  · Raise the head of the bed up 6 to 8 inches (15 to 20 cm) or use a wedge pillow. This position may  keep stomach acid from getting into the esophagus.  What follow-up care is needed?   Your doctor may ask you to make visits to the office to check on your progress. Be sure to keep these visits.   What drugs may be needed?   The doctor may order drugs to:  · Lower stomach acid  · Stop heartburn  · Help with pain  · Soften stools  Will physical activity be limited?   · You may have to limit your activity. Talk to the doctor about the right amount of activity for you. Avoid sports that involve lifting heavy things and bending. This can cause stress on your belly.  · Avoid straining. Having trouble passing stool or hard stools may make your hernia worse.  What changes to diet are needed?   · Avoid large, heavy meals. Eat a few small meals throughout the day.  · Avoid drinking too much with meals.  · Wait at least 2 to 3 hours after eating before lying down or bending over.  · Avoid foods that may upset the stomach. Some people have an upset belly after:  ? Coffee  ? Citrus fruits and juices  ? Tomato products  ? Hot peppers  ? Fizzy drinks  ? Chocolate  ? Peppermint  ? Fatty foods  ? Beer, wine, and mixed drinks (alcohol)  What problems could happen?   · Lower blood levels of iron  · No blood flow to hernia  What can be done to prevent this health problem?   · Keep a healthy weight.  · Lose weight if you are overweight.  · Avoid smoking. Ask for help if it's hard to quit.  · Avoid foods that may upset the stomach.  · Dont overeat. Eat smaller meals.  · Try to eat at least 2 to 3 hours before laying down. Avoid eating before sleeping at night.  When do I need to call the doctor?   · Heartburn that is worse when bending over or lying down  · Swallowing problems  Teach Back: Helping You Understand   The Teach Back Method helps you understand the information we are giving you. After you talk with the staff, tell them in your own words what you learned. This helps to make sure the staff has described each thing clearly. It  also helps to explain things that may have been confusing. Before going home, make sure you can do these:  · I can tell you about my condition.  · I can tell you what changes I need to make with my diet or drugs.  · I can tell you what I will do if I have more heartburn when I am bending over or lying down.  Where can I learn more?   [facility] Cho[facility]  http://www.nhs.uk/conditions/hernia-hiatus/pages/introduction.aspx   Last Reviewed Date   2021-09-28  Consumer Information Use and Disclaimer   This information is not specific medical advice and does not replace information you receive from your health care provider. This is only a brief summary of general information. It does NOT include all information about conditions, illnesses, injuries, tests, procedures, treatments, therapies, discharge instructions or life-style choices that may apply to you. You must talk with your health care provider for complete information about your health and treatment options. This information should not be used to decide whether or not to accept your health care providers advice, instructions or recommendations. Only your health care provider has the knowledge and training to provide advice that is right for you.  Copyright   Copyright © 2021 [employer] and its affiliates and/or licensors. All rights reserved.  Patient Education       Gastritis Discharge Instructions   About this topic   Gastritis is inflammation of the lining of the stomach. Sometimes gastritis is caused by bacteria. Other times, it can be caused by drugs. Some types of drugs can cause gastritis. The most common are nonsteroidal anti-inflammatory drugs (NSAIDs) like ibuprofen or naproxen. Gastritis can also be caused by other things like drinking alcohol or having a serious illness. It can also happen if you have a health problem in which the bodys own infection fighting system attacks the stomach lining. Based on the cause, you may need to take an antibiotic or  other medicine to treat your gastritis. If so, be sure you finish all of the medicine that is ordered.     What care is needed at home?   · Ask your doctor what you need to do when you go home. Make sure you ask questions if you do not understand what the doctor says.  · Eat small meals more often to help with belly pain.  · Keep a diary about your pain and the foods you eat. Then you can avoid those that bother your stomach.  · Avoid or limit spicy foods.  · Avoid or limit beer, wine, and mixed drinks.  · If you smoke, try to quit. Your doctor or nurse can help.  · Try to learn ways to manage stress. Stress may cause the acid levels in your stomach to rise.  · If possible, avoid long-term use of aspirin and other anti-inflammatory drugs.  What follow-up care is needed?   Your doctor may ask you to make visits to the office to check on your progress. Be sure to keep these visits.  What drugs may be needed?   The doctor may order drugs to:  · Fight an infection  · Control how much acid your stomach makes  · Help healing  Will physical activity be limited?   You may want to limit your activity if you have belly pain. Having an upset stomach or throwing up may also limit what you do. You may need more rest if you feel weak or tired.  What problems could happen?   · Stomach ulcer or bleeding  · Stomach cancer  When do I need to call the doctor?   · You start throwing up blood or pass a lot of blood in your stool.  · Your belly pain becomes much worse all of a sudden or over a few hours.  · Your belly becomes hard or tender.  · You have chest pain or trouble breathing  · Your stools are bright red, black, or tar-colored.  · You are throwing up often.  · Your belly pain does not get better even after taking medicine, changing your diet, and following treatment instructions.  · You lose a lot of weight without trying.  Teach Back: Helping You Understand   The Teach Back Method helps you understand the information we are  giving you. After you talk with the staff, tell them in your own words what you learned. This helps to make sure the staff has described each thing clearly. It also helps to explain things that may have been confusing. Before going home, make sure you can do these:  · I can tell you about my condition.  · I can tell you if I need to make changes with my diet or drugs.  · I can tell you what I will do if I throw up blood or have bloody or black tarry stools.  Where can I learn more?   [facility] [place]  https://www.nhs.uk/conditions/gastritis/   Last Reviewed Date   2021-06-08  Consumer Information Use and Disclaimer   This information is not specific medical advice and does not replace information you receive from your health care provider. This is only a brief summary of general information. It does NOT include all information about conditions, illnesses, injuries, tests, procedures, treatments, therapies, discharge instructions or life-style choices that may apply to you. You must talk with your health care provider for complete information about your health and treatment options. This information should not be used to decide whether or not to accept your health care providers advice, instructions or recommendations. Only your health care provider has the knowledge and training to provide advice that is right for you.  Copyright   Copyright © 2021 [employer] and its affiliates and/or licensors. All rights reserved.

## 2022-02-18 NOTE — ANESTHESIA PREPROCEDURE EVALUATION
02/18/2022  Devan Selby is a 21 y.o., female.    Anesthesia Evaluation    I have reviewed the Patient Summary Reports.    I have reviewed the Nursing Notes.    I have reviewed the Medications.     Review of Systems  Anesthesia Hx:  No problems with previous Anesthesia    Social:  Non-Smoker    Cardiovascular:  Cardiovascular Normal     Pulmonary:  Pulmonary Normal    Renal/:  Renal/ Normal     Neurological:  Neurology Normal    Endocrine:  Endocrine Normal        Physical Exam  General:  Well nourished    Airway/Jaw/Neck:  Airway Findings: Mouth Opening: Normal Tongue: Normal  General Airway Assessment: Adult  Oropharynx Findings:  Mallampati: II  Jaw/Neck Findings:  Neck ROM: Normal ROM     Eyes/Ears/Nose:  Eyes/Ears/Nose Findings:    Dental:  Dental Findings:   Chest/Lungs:  Chest/Lungs Findings: Normal Respiratory Rate     Heart/Vascular:  Heart Findings: Rate: Normal  Rhythm: Regular Rhythm        Mental Status:  Mental Status Findings:  Cooperative, Alert and Oriented         Anesthesia Plan  Type of Anesthesia, risks & benefits discussed:  Anesthesia Type:  general    Patient's Preference:   Plan Factors:          Intra-op Monitoring Plan: standard ASA monitors  Intra-op Monitoring Plan Comments:   Post Op Pain Control Plan: multimodal analgesia  Post Op Pain Control Plan Comments:     Induction:   IV  Beta Blocker:  Patient is not currently on a Beta-Blocker (No further documentation required).       Informed Consent: Patient understands risks and agrees with Anesthesia plan.  Questions answered. Anesthesia consent signed with patient.  ASA Score: 1     Day of Surgery Review of History & Physical:        Anesthesia Plan Notes: UPT negative        Ready For Surgery From Anesthesia Perspective.

## 2022-02-18 NOTE — [employer] PATIENT INSTRUCTIONS
Discharge Summary/Instructions after an Endoscopic Procedure  Patient Name: Devan Selby  Patient MRN: [id number]  Patient YOB: 2000 Friday, February 18, 2022  Coleman Escoto MD  Dear patient,  As a result of recent federal legislation (The Federal Cures Act), you may   receive lab or pathology results from your procedure in your [facility]   account before your physician is able to contact you. Your physician or   their representative will relay the results to you with their   recommendations at their soonest availability.  Thank you,  RESTRICTIONS:  During your procedure today, you received medications for sedation.  These   medications may affect your judgment, balance and coordination.  Therefore,   for 24 hours, you have the following restrictions:   - DO NOT drive a car, operate machinery, make legal/financial decisions,   sign important papers or drink alcohol.    ACTIVITY:  Today: no heavy lifting, straining or running due to procedural   sedation/anesthesia.  The following day: return to full activity including work.  DIET:  Eat and drink normally unless instructed otherwise.     TREATMENT FOR COMMON SIDE EFFECTS:  - Mild abdominal pain, nausea, belching, bloating or excessive gas:  rest,   eat lightly and use a heating pad.  - Sore Throat: treat with throat lozenges and/or gargle with warm salt   water.  - Because air was used during the procedure, expelling large amounts of air   from your rectum or belching is normal.  - If a bowel prep was taken, you may not have a bowel movement for 1-3 days.    This is normal.  SYMPTOMS TO WATCH FOR AND REPORT TO YOUR PHYSICIAN:  1. Abdominal pain or bloating, other than gas cramps.  2. Chest pain.  3. Back pain.  4. Signs of infection such as: chills or fever occurring within 24 hours   after the procedure.  5. Rectal bleeding, which would show as bright red, maroon, or black stools.   (A tablespoon of blood from the rectum is not serious, especially  if   hemorrhoids are present.)  6. Vomiting.  7. Weakness or dizziness.  GO DIRECTLY TO THE NEAREST EMERGENCY ROOM IF YOU HAVE ANY OF THE FOLLOWING:      Difficulty breathing              Chills and/or fever over 101 F   Persistent vomiting and/or vomiting blood   Severe abdominal pain   Severe chest pain   Black, tarry stools   Bleeding- more than one tablespoon   Any other symptom or condition that you feel may need urgent attention  Your doctor recommends these additional instructions:  If any biopsies were taken, your doctors clinic will contact you in 1 to 2   weeks with any results.  - Patient has a contact number available for emergencies.  The signs and   symptoms of potential delayed complications were discussed with the   patient.  Return to normal activities tomorrow.  Written discharge   instructions were provided to the patient.   - Resume previous diet.   - Continue present medications.   - No aspirin, ibuprofen, naproxen, or other non-steroidal anti-inflammatory   drugs.   - Await pathology results.   - Discharge patient to home (ambulatory).   - Follow an antireflux regimen.   - Return to my office PRN.  For questions, problems or results please call your physician - Coleman Escoto MD at Work:  [phone].  [facility], EMERGENCY ROOM PHONE NUMBER: [phone]  IF A COMPLICATION OR EMERGENCY SITUATION ARISES AND YOU ARE UNABLE TO REACH   YOUR PHYSICIAN - GO DIRECTLY TO THE EMERGENCY ROOM.  Coleman Escoto MD  2/18/2022 1:06:55 PM  This report has been verified and signed electronically.  Dear patient,  As a result of recent federal legislation (The Federal Cures Act), you may   receive lab or pathology results from your procedure in your [facility]   account before your physician is able to contact you. Your physician or   their representative will relay the results to you with their   recommendations at their soonest availability.  Thank you,  [employer]

## 2022-02-18 NOTE — TRANSFER OF CARE
"Anesthesia Transfer of Care Note    Patient: Devan Selby    Procedure(s) Performed: Procedure(s) (LRB):  EGD (ESOPHAGOGASTRODUODENOSCOPY) (N/A)    Patient location: PACU    Anesthesia Type: general    Transport from OR: Transported from OR on room air with adequate spontaneous ventilation    Post pain: adequate analgesia    Post assessment: no apparent anesthetic complications    Post vital signs: stable    Level of consciousness: awake    Nausea/Vomiting: no nausea/vomiting    Complications: none    Transfer of care protocol was followed      Last vitals:   Visit Vitals  /87 (BP Location: Left arm, Patient Position: Lying)   Pulse 72   Temp 36.8 °C (98.2 °F) (Skin)   Resp 16   Ht 5' 6" (1.676 m)   Wt 54.4 kg (120 lb)   LMP 02/14/2022   SpO2 99%   BMI 19.37 kg/m²     "

## 2022-02-24 ENCOUNTER — TELEPHONE (OUTPATIENT)
Dept: GASTROENTEROLOGY | Facility: CLINIC | Age: 22
End: 2022-02-24
Payer: COMMERCIAL

## 2022-02-24 LAB
COMMENT: NORMAL
FINAL PATHOLOGIC DIAGNOSIS: NORMAL
GROSS: NORMAL
Lab: NORMAL

## 2022-02-24 RX ORDER — CLARITHROMYCIN 500 MG/1
500 TABLET, FILM COATED ORAL 2 TIMES DAILY
Qty: 20 TABLET | Refills: 0 | Status: SHIPPED | OUTPATIENT
Start: 2022-02-24 | End: 2022-03-06

## 2022-02-24 RX ORDER — AMOXICILLIN 500 MG/1
1000 CAPSULE ORAL EVERY 12 HOURS
Qty: 40 CAPSULE | Refills: 0 | Status: SHIPPED | OUTPATIENT
Start: 2022-02-24 | End: 2022-03-06

## 2022-02-25 ENCOUNTER — TELEPHONE (OUTPATIENT)
Dept: GASTROENTEROLOGY | Facility: CLINIC | Age: 22
End: 2022-02-25
Payer: COMMERCIAL

## 2022-02-25 NOTE — TELEPHONE ENCOUNTER
Patient notified and understands biopsy results and instructed on h. Pylori medications. F/u 3 months recall placed

## 2022-02-25 NOTE — TELEPHONE ENCOUNTER
----- Message from Sharla Bhatia sent at 2/25/2022 12:33 PM CST -----  Contact: pt  Type:  Patient Returning Call    Who Called:  pt  Who Left Message for Patient:  [place]  Does the patient know what this is regarding?:  yes  Best Call Back Number: [phone]  Additional Information:

## 2022-05-25 ENCOUNTER — TELEPHONE (OUTPATIENT)
Dept: NEUROLOGY | Facility: CLINIC | Age: 22
End: 2022-05-25
Payer: COMMERCIAL

## 2022-06-02 ENCOUNTER — OFFICE VISIT (OUTPATIENT)
Dept: DERMATOLOGY | Facility: CLINIC | Age: 22
End: 2022-06-02
Payer: COMMERCIAL

## 2022-06-02 DIAGNOSIS — B07.9 VERRUCA VULGARIS: Primary | ICD-10-CM

## 2022-06-02 DIAGNOSIS — Z12.83 SKIN CANCER SCREENING: ICD-10-CM

## 2022-06-02 DIAGNOSIS — L82.1 SEBORRHEIC KERATOSIS: ICD-10-CM

## 2022-06-02 PROCEDURE — 17110 PR DESTRUCTION BENIGN LESIONS UP TO 14: ICD-10-PCS | Mod: S$GLB,,, | Performed by: STUDENT IN AN ORGANIZED HEALTH CARE EDUCATION/TRAINING PROGRAM

## 2022-06-02 PROCEDURE — 1160F PR REVIEW ALL MEDS BY PRESCRIBER/CLIN PHARMACIST DOCUMENTED: ICD-10-PCS | Mod: CPTII,S$GLB,, | Performed by: STUDENT IN AN ORGANIZED HEALTH CARE EDUCATION/TRAINING PROGRAM

## 2022-06-02 PROCEDURE — 17110 DESTRUCTION B9 LES UP TO 14: CPT | Mod: S$GLB,,, | Performed by: STUDENT IN AN ORGANIZED HEALTH CARE EDUCATION/TRAINING PROGRAM

## 2022-06-02 PROCEDURE — 99999 PR PBB SHADOW E&M-EST. PATIENT-LVL II: ICD-10-PCS | Mod: PBBFAC,,, | Performed by: STUDENT IN AN ORGANIZED HEALTH CARE EDUCATION/TRAINING PROGRAM

## 2022-06-02 PROCEDURE — 1159F PR MEDICATION LIST DOCUMENTED IN MEDICAL RECORD: ICD-10-PCS | Mod: CPTII,S$GLB,, | Performed by: STUDENT IN AN ORGANIZED HEALTH CARE EDUCATION/TRAINING PROGRAM

## 2022-06-02 PROCEDURE — 1160F RVW MEDS BY RX/DR IN RCRD: CPT | Mod: CPTII,S$GLB,, | Performed by: STUDENT IN AN ORGANIZED HEALTH CARE EDUCATION/TRAINING PROGRAM

## 2022-06-02 PROCEDURE — 1159F MED LIST DOCD IN RCRD: CPT | Mod: CPTII,S$GLB,, | Performed by: STUDENT IN AN ORGANIZED HEALTH CARE EDUCATION/TRAINING PROGRAM

## 2022-06-02 PROCEDURE — 99203 PR OFFICE/OUTPT VISIT, NEW, LEVL III, 30-44 MIN: ICD-10-PCS | Mod: 25,S$GLB,, | Performed by: STUDENT IN AN ORGANIZED HEALTH CARE EDUCATION/TRAINING PROGRAM

## 2022-06-02 PROCEDURE — 99203 OFFICE O/P NEW LOW 30 MIN: CPT | Mod: 25,S$GLB,, | Performed by: STUDENT IN AN ORGANIZED HEALTH CARE EDUCATION/TRAINING PROGRAM

## 2022-06-02 PROCEDURE — 99999 PR PBB SHADOW E&M-EST. PATIENT-LVL II: CPT | Mod: PBBFAC,,, | Performed by: STUDENT IN AN ORGANIZED HEALTH CARE EDUCATION/TRAINING PROGRAM

## 2022-06-02 NOTE — PATIENT INSTRUCTIONS

## 2022-06-02 NOTE — PROGRESS NOTES
Patient Information  Name: Devan Selby  : 2000  MRN: [id number]     Referring Physician:  Dr. Tyson   Primary Care Physician:  Dr. Tiffany Miller MD   Date of Visit: 2022      Subjective:       Devan Selby is a 21 y.o. female who presents for   Chief Complaint   Patient presents with    Skin Check     Patient needs a skin screening     Lesion     Warts. X x years. Tx previous tx cyro therapy      HPI  Patient here for skin check.     Does patient have a personal hx of skin cancers? no  Does patient have family hx of melanoma?  no  Does patient have hx of strong sun exposure or tanning bed use in the past? No    Pt only wants her arms and legs checked.    Patient with new complaint of lesion(s)  Location: left knee  Duration: years  Symptoms: nonhealing  Relieving factors/Previous treatments: LN2      Patient was last seen:Visit date not found     Prior notes by myself reviewed.   Clinical documentation obtained by nursing staff reviewed.    Review of Systems   Skin: Negative for itching and rash.        Objective:    Physical Exam   Constitutional: She appears well-developed and well-nourished. No distress.   Neurological: She is alert and oriented to person, place, and time. She is not disoriented.   Psychiatric: She has a normal mood and affect.   Skin:   Areas Examined (abnormalities noted in diagram):   Head / Face Inspection Performed  Neck Inspection Performed  RUE Inspected  LUE Inspection Performed  RLE Inspected  LLE Inspection Performed              Diagram Legend     Erythematous scaling macule/papule c/w actinic keratosis       Vascular papule c/w angioma      Pigmented verrucoid papule/plaque c/w seborrheic keratosis      Yellow umbilicated papule c/w sebaceous hyperplasia      Irregularly shaped tan macule c/w lentigo     1-2 mm smooth white papules consistent with Milia      Movable subcutaneous cyst with punctum c/w epidermal inclusion cyst      Subcutaneous movable  cyst c/w pilar cyst      Firm pink to brown papule c/w dermatofibroma      Pedunculated fleshy papule(s) c/w skin tag(s)      Evenly pigmented macule c/w junctional nevus     Mildly variegated pigmented, slightly irregular-bordered macule c/w mildly atypical nevus      Flesh colored to evenly pigmented papule c/w intradermal nevus       Pink pearly papule/plaque c/w basal cell carcinoma      Erythematous hyperkeratotic cursted plaque c/w SCC      Surgical scar with no sign of skin cancer recurrence      Open and closed comedones      Inflammatory papules and pustules      Verrucoid papule consistent consistent with wart     Erythematous eczematous patches and plaques     Dystrophic onycholytic nail with subungual debris c/w onychomycosis     Umbilicated papule    Erythematous-base heme-crusted tan verrucoid plaque consistent with inflamed seborrheic keratosis     Erythematous Silvery Scaling Plaque c/w Psoriasis     See annotation      No images are attached to the encounter or orders placed in the encounter.    [] Data reviewed  [] Independent review of test  [] Management discussed with another provider    Assessment / Plan:        Verruca vulgaris  Cryosurgery procedure note:    Verbal consent from the patient is obtained including, but not limited to, risk of hypopigmentation/hyperpigmentation, scar, recurrence of lesion. Liquid nitrogen cryosurgery is applied to 5 verruca, as detailed in the physical exam, to produce a freeze injury. 2 consecutive freeze thaw cycles are applied to each verruca. The patient is aware that blisters (possibly blood blisters) may form.    Skin cancer screening  Upper body skin examination performed today including at least 6 points as noted in physical examination. No lesions suspicious for malignancy noted.  Recommend daily sun protection/avoidance and use of at least SPF 30, broad spectrum sunscreen (OTC drug).     Seborrheic keratosis  These are benign inherited growths without a  malignant potential. Reassurance given to patient. No treatment is necessary.            LOS NUMBER AND COMPLEXITY OF PROBLEMS    COMPLEXITY OF DATA RISK TOTAL TIME (m)   38843  87780 [] 1 self-limited or minor problem [x] Minimal to none [] No treatment recommended or patient to monitor 15-29  10-19   69505  97718 Low  [] 2 or > self limited or minor problems  [x] 1 stable chronic illness  [] 1 acute, uncomplicated illness or injury Limited (2)  [] Prior external notes from each unique source  [] Review result of each unique test  [] Order each unique test [x]  Low  OTC medications, minor skin biopsy 30-44  20-29   98479  20012 Moderate  []  1 or > chronic illness with progression, exacerbation or SE of treatment  []  2 or more stable chronic illnesses  []  1 acute illness with systemic symptoms  []  1 acute complicated injury  []  1 undiagnosed new problem with uncertain prognosis Moderate (1/3 below)  []  3 or more data items        *Now includes assessment requiring independent historian  []  Independent interpretation of a test  []  Discuss management/test with another provider Moderate  []  Prescription drug mgmt  []  Minor surgery with risk discussed  []  Mgmt limited by social determinates 45-59  30-39   44179  61962 High  []  1 or more chronic illness with severe exacerbation, progression or SE of treatment  []  1 acute or chronic illness/injury that poses a threat to life or bodily function Extensive (2/3 below)  []  3 or more data items        *Now includes assessment requiring independent historian.  []  Independent interpretation of a test  []  Discuss management/test with another provider High  []  Major surgery with risk discussed  []  Drug therapy requiring intensive monitoring for toxicity  []  Hospitalization  []  Decision for DNR 60-74  40-54      No follow-ups on file.    Lizett Shultz MD, FAAD  [facility]

## 2022-06-06 ENCOUNTER — OFFICE VISIT (OUTPATIENT)
Dept: GASTROENTEROLOGY | Facility: CLINIC | Age: 22
End: 2022-06-06
Payer: COMMERCIAL

## 2022-06-06 VITALS
RESPIRATION RATE: 16 BRPM | SYSTOLIC BLOOD PRESSURE: 124 MMHG | HEART RATE: 55 BPM | BODY MASS INDEX: 19.07 KG/M2 | DIASTOLIC BLOOD PRESSURE: 74 MMHG | HEIGHT: 66 IN | WEIGHT: 118.63 LBS

## 2022-06-06 DIAGNOSIS — K21.9 GASTROESOPHAGEAL REFLUX DISEASE, UNSPECIFIED WHETHER ESOPHAGITIS PRESENT: ICD-10-CM

## 2022-06-06 DIAGNOSIS — R12 HEARTBURN: ICD-10-CM

## 2022-06-06 DIAGNOSIS — K44.9 HIATAL HERNIA: ICD-10-CM

## 2022-06-06 DIAGNOSIS — A04.8 H. PYLORI INFECTION: Primary | ICD-10-CM

## 2022-06-06 DIAGNOSIS — R11.0 NAUSEA: ICD-10-CM

## 2022-06-06 DIAGNOSIS — K22.2 SCHATZKI'S RING: ICD-10-CM

## 2022-06-06 PROCEDURE — 3074F SYST BP LT 130 MM HG: CPT | Mod: CPTII,S$GLB,,

## 2022-06-06 PROCEDURE — 3008F BODY MASS INDEX DOCD: CPT | Mod: CPTII,S$GLB,,

## 2022-06-06 PROCEDURE — 3008F PR BODY MASS INDEX (BMI) DOCUMENTED: ICD-10-PCS | Mod: CPTII,S$GLB,,

## 2022-06-06 PROCEDURE — 1159F PR MEDICATION LIST DOCUMENTED IN MEDICAL RECORD: ICD-10-PCS | Mod: CPTII,S$GLB,,

## 2022-06-06 PROCEDURE — 3074F PR MOST RECENT SYSTOLIC BLOOD PRESSURE < 130 MM HG: ICD-10-PCS | Mod: CPTII,S$GLB,,

## 2022-06-06 PROCEDURE — 99214 PR OFFICE/OUTPT VISIT, EST, LEVL IV, 30-39 MIN: ICD-10-PCS | Mod: S$GLB,,,

## 2022-06-06 PROCEDURE — 99999 PR PBB SHADOW E&M-EST. PATIENT-LVL IV: ICD-10-PCS | Mod: PBBFAC,,,

## 2022-06-06 PROCEDURE — 99999 PR PBB SHADOW E&M-EST. PATIENT-LVL IV: CPT | Mod: PBBFAC,,,

## 2022-06-06 PROCEDURE — 3078F DIAST BP <80 MM HG: CPT | Mod: CPTII,S$GLB,,

## 2022-06-06 PROCEDURE — 3078F PR MOST RECENT DIASTOLIC BLOOD PRESSURE < 80 MM HG: ICD-10-PCS | Mod: CPTII,S$GLB,,

## 2022-06-06 PROCEDURE — 99214 OFFICE O/P EST MOD 30 MIN: CPT | Mod: S$GLB,,,

## 2022-06-06 PROCEDURE — 1160F PR REVIEW ALL MEDS BY PRESCRIBER/CLIN PHARMACIST DOCUMENTED: ICD-10-PCS | Mod: CPTII,S$GLB,,

## 2022-06-06 PROCEDURE — 1160F RVW MEDS BY RX/DR IN RCRD: CPT | Mod: CPTII,S$GLB,,

## 2022-06-06 PROCEDURE — 1159F MED LIST DOCD IN RCRD: CPT | Mod: CPTII,S$GLB,,

## 2022-06-06 RX ORDER — BISMUTH SUBSALICYLATE 262 MG/1
524 TABLET ORAL EVERY 6 HOURS
Qty: 80 TABLET | Refills: 0 | Status: CANCELLED | OUTPATIENT
Start: 2022-06-06 | End: 2022-06-16

## 2022-06-06 RX ORDER — OMEPRAZOLE 20 MG/1
20 CAPSULE, DELAYED RELEASE ORAL DAILY
Qty: 30 CAPSULE | Refills: 2 | Status: SHIPPED | OUTPATIENT
Start: 2022-06-06 | End: 2023-09-12 | Stop reason: SINTOL

## 2022-06-06 RX ORDER — ONDANSETRON 4 MG/1
4 TABLET, ORALLY DISINTEGRATING ORAL EVERY 12 HOURS PRN
Qty: 60 TABLET | Refills: 0 | Status: SHIPPED | OUTPATIENT
Start: 2022-06-06 | End: 2022-09-06

## 2022-06-06 RX ORDER — METRONIDAZOLE 500 MG/1
250 TABLET ORAL EVERY 6 HOURS
Qty: 20 TABLET | Refills: 0 | Status: CANCELLED | OUTPATIENT
Start: 2022-06-06 | End: 2022-06-16

## 2022-06-06 RX ORDER — OMEPRAZOLE 20 MG/1
20 CAPSULE, DELAYED RELEASE ORAL 2 TIMES DAILY
Qty: 20 CAPSULE | Refills: 0 | Status: CANCELLED | OUTPATIENT
Start: 2022-06-06 | End: 2022-06-16

## 2022-06-06 RX ORDER — TETRACYCLINE HYDROCHLORIDE 500 MG/1
500 CAPSULE ORAL EVERY 6 HOURS
Qty: 40 CAPSULE | Refills: 0 | Status: CANCELLED | OUTPATIENT
Start: 2022-06-06 | End: 2022-06-16

## 2022-06-06 NOTE — PROGRESS NOTES
Subjective:       Patient ID: Devan Selby is a 21 y.o. female Body mass index is 19.14 kg/m².    Chief Complaint: Follow-up    Established patient of Dr. Paredes & myself.     Gastroesophageal Reflux  She complains of heartburn (occurs 1-2 times a week) and nausea (occasionally). She reports no abdominal pain, no belching, no chest pain, no choking, no coughing, no dysphagia, no early satiety, no globus sensation, no hoarse voice, no sore throat or no water brash. This is a chronic problem. The current episode started more than 1 month ago (started 11/2021). The problem occurs frequently. The problem has been gradually improving. The heartburn duration is an hour (at night). The heartburn is located in the substernum. The heartburn is of mild intensity. The heartburn does not wake her from sleep. The heartburn does not limit her activity. The heartburn doesn't change with position. Nothing aggravates the symptoms. Pertinent negatives include no anemia, fatigue, melena, muscle weakness or weight loss. Risk factors include hiatal hernia. She has tried a PPI (currently taking prilosec 40 mg PRN & pepto bismol) for the symptoms. The treatment provided significant relief. Past procedures include an EGD (EGD 02/18/22 - Normal esophagus. Z-line regular, 40 cm from the incisors. Mild Schatzki ring. Dilated. Medium-sized hiatal hernia. Erythematous mucosa in the antrum. Biopsied. Normal examined duodenum; patho: positive for h. Pylori - completed treatment). Past procedures do not include an abdominal ultrasound, esophageal manometry, esophageal pH monitoring, H. pylori antibody titer or a UGI. Past invasive treatments do not include gastroplasty, gastroplication or reflux surgery.     Review of Systems   Constitutional: Negative for activity change, appetite change, chills, diaphoresis, fatigue, fever, unexpected weight change and weight loss.   HENT: Negative for hoarse voice, sore throat and trouble swallowing.     Respiratory: Negative for cough, choking and shortness of breath.    Cardiovascular: Negative for chest pain.   Gastrointestinal: Positive for heartburn (occurs 1-2 times a week) and nausea (occasionally). Negative for abdominal distention, abdominal pain, anal bleeding, blood in stool, constipation, diarrhea, dysphagia, melena, rectal pain and vomiting.   Musculoskeletal: Negative for muscle weakness.       No LMP recorded.  History reviewed. No pertinent past medical history.  Past Surgical History:   Procedure Laterality Date    ESOPHAGOGASTRODUODENOSCOPY N/A 2/18/2022    Procedure: EGD (ESOPHAGOGASTRODUODENOSCOPY);  Surgeon: Coleman Paredes MD;  Location: [facility];  Service: Endoscopy;  Laterality: N/A;    UPPER GASTROINTESTINAL ENDOSCOPY       Family History   Problem Relation Age of Onset    Colon cancer Neg Hx     Colon polyps Neg Hx     Crohn's disease Neg Hx     Ulcerative colitis Neg Hx     Stomach cancer Neg Hx     Rectal cancer Neg Hx      Social History     Tobacco Use    Smoking status: Never Smoker    Smokeless tobacco: Never Used   Substance Use Topics    Alcohol use: No    Drug use: No     Wt Readings from Last 10 Encounters:   06/06/22 53.8 kg (118 lb 9.7 oz)   02/18/22 54.4 kg (120 lb)   02/14/22 54 kg (119 lb 0.8 oz)   01/28/22 54.4 kg (119 lb 14.9 oz)   04/09/19 54.4 kg (120 lb) (43 %, Z= -0.18)*   02/25/19 54.4 kg (120 lb) (43 %, Z= -0.17)*     * Growth percentiles are based on CDC (Girls, 2-20 Years) data.     Lab Results   Component Value Date    WBC 4.88 01/28/2022    HGB 13.5 01/28/2022    HCT 42.0 01/28/2022    MCV 91 01/28/2022     01/28/2022     CMP  Sodium   Date Value Ref Range Status   01/28/2022 137 136 - 145 mmol/L Final     Potassium   Date Value Ref Range Status   01/28/2022 4.6 3.5 - 5.1 mmol/L Final     Chloride   Date Value Ref Range Status   01/28/2022 106 95 - 110 mmol/L Final     CO2   Date Value Ref Range Status   01/28/2022 22 (L) 23 - 29 mmol/L  Final     Glucose   Date Value Ref Range Status   01/28/2022 80 70 - 110 mg/dL Final     BUN   Date Value Ref Range Status   01/28/2022 13 6 - 20 mg/dL Final     Creatinine   Date Value Ref Range Status   01/28/2022 0.9 0.5 - 1.4 mg/dL Final     Calcium   Date Value Ref Range Status   01/28/2022 9.9 8.7 - 10.5 mg/dL Final     Total Protein   Date Value Ref Range Status   01/28/2022 8.1 6.0 - 8.4 g/dL Final     Albumin   Date Value Ref Range Status   01/28/2022 4.2 3.5 - 5.2 g/dL Final     Total Bilirubin   Date Value Ref Range Status   01/28/2022 0.4 0.1 - 1.0 mg/dL Final     Comment:     For infants and newborns, interpretation of results should be based  on gestational age, weight and in agreement with clinical  observations.    Premature Infant recommended reference ranges:  Up to 24 hours.............<8.0 mg/dL  Up to 48 hours............<12.0 mg/dL  3-5 days..................<15.0 mg/dL  6-29 days.................<15.0 mg/dL       Alkaline Phosphatase   Date Value Ref Range Status   01/28/2022 40 (L) 55 - 135 U/L Final     AST   Date Value Ref Range Status   01/28/2022 32 10 - 40 U/L Final     ALT   Date Value Ref Range Status   01/28/2022 17 10 - 44 U/L Final     Anion Gap   Date Value Ref Range Status   01/28/2022 9 8 - 16 mmol/L Final     eGFR if    Date Value Ref Range Status   01/28/2022 >60.0 >60 mL/min/1.73 m^2 Final     eGFR if non    Date Value Ref Range Status   01/28/2022 >60.0 >60 mL/min/1.73 m^2 Final     Comment:     Calculation used to obtain the estimated glomerular filtration  rate (eGFR) is the CKD-EPI equation.        Lab Results   Component Value Date    AMYLASE 142 (H) 01/28/2022     Lab Results   Component Value Date    LIPASE 29 01/28/2022     Reviewed prior medical records including radiology report of abdominal CT 02/14/22 & endoscopy history of EGD 02/18/22 (see surgical history).    Objective:      Physical Exam  Vitals and nursing note reviewed.  "  Constitutional:       General: She is not in acute distress.     Appearance: Normal appearance. She is normal weight. She is not ill-appearing.   HENT:      Mouth/Throat:      Comments: Unable to assess due to COVID-19 concerns.  Eyes:      Extraocular Movements: Extraocular movements intact.      Pupils: Pupils are equal, round, and reactive to light.   Cardiovascular:      Rate and Rhythm: Normal rate and regular rhythm.   Pulmonary:      Effort: Pulmonary effort is normal. No respiratory distress.      Breath sounds: Normal breath sounds.   Abdominal:      General: Abdomen is flat. Bowel sounds are normal. There is no distension or abdominal bruit. There are no signs of injury.      Palpations: Abdomen is soft. There is no shifting dullness, fluid wave, hepatomegaly, splenomegaly or mass.      Tenderness: There is no abdominal tenderness. There is no guarding or rebound. Negative signs include No's sign, Rovsing's sign and McBurney's sign.      Hernia: No hernia is present.   Skin:     General: Skin is warm and dry.      Coloration: Skin is not jaundiced or pale.   Neurological:      Mental Status: She is alert and oriented to person, place, and time.   Psychiatric:         Attention and Perception: Attention normal.         Mood and Affect: Mood normal.         Speech: Speech normal.         Behavior: Behavior normal.         Assessment:       1. H. pylori infection    2. Gastroesophageal reflux disease, unspecified whether esophagitis present    3. Heartburn    4. Hiatal hernia    5. Nausea    6. Schatzki's ring        Plan:       H. pylori infection  - H. pylori infection occurs when a type of bacteria called "H. pylori" infects a person's stomach. Many people have H. pylori infection. Most of the time, H. pylori infection does not lead to any problems or cause any symptoms. But in some people, H. pylori infection leads to problems that can cause symptoms such as stomach ulcers or stomach cancer. These " conditions can sometimes cause pain or discomfort in the upper belly, nausea, or vomiting. Doctors do not know why H. pylori infection leads to problems in some people and not others.   -Stool H. Pylori antigen needs to be done off the following medications for the following amount of time:   1. Off all Antibiotics for 4 (Four) weeks before stool collection.   2. Off all Proton Pump Inhibitors medications for 2 (Two) weeks before collecting stool for H. Pylori Antigen:   3. Off all H2 blockers medications for 2 (Two) weeks before collecting stool for H. Pylori Antigen:   4. Off Pepto-Bismol for 4 (four) weeks prior to collecting stool for H. Pylori Antigen.   -     H. pylori antigen, stool; Future; Expected date: 06/06/2022    Gastroesophageal reflux disease, unspecified whether esophagitis present & Heartburn  -discussed about the different types of medications used to treat reflux and how to use them, antacids can be used PRN for breakthrough heartburn symptoms by reducing stomach acid that is already produced, H2 blockers work by limiting the amount acid production, & PPI's work to block acid production and are taken daily, patient verbalized understanding.  -Educated patient on lifestyle modifications to help control/reduce reflux/abdominal pain including: avoid large meals, avoid eating within 2-3 hours of bedtime (avoid late night eating & lying down soon after eating), elevate head of bed if nocturnal symptoms are present, smoking cessation (if current smoker), & weight loss (if overweight).   -Educated to avoid known foods which trigger reflux symptoms & to minimize/avoid high-fat foods, chocolate, caffeine, citrus, alcohol, & tomato products.  -Advised to avoid/limit use of NSAID's, since they can cause GI upset, bleeding, and/or ulcers. If needed, take with food.   -START: omeprazole (PRILOSEC) 20 MG capsule; Take 1 capsule (20 mg total) by mouth once daily.  Dispense: 30 capsule; Refill: 2    Hiatal  hernia  -discussed diagnosis with patient & that it is usually managed by controlling reflux symptoms, surgery is an option, but usually performed if reflux is uncontrolled by medication management and lifestyle/dietary modifications; if symptoms persist despite medication management and lifestyle/dietary modifications, we can refer to general surgery to consult about surgical options, patient verbalized understanding    Nausea  -START: ondansetron (ZOFRAN-ODT) 4 MG TbDL; Take 1 tablet (4 mg total) by mouth every 12 (twelve) hours as needed (nausea).  Dispense: 60 tablet; Refill: 0    Schatzki's ring  -Follow-up for esophageal dilation PRN    Follow up in about 4 weeks (around 7/4/2022), or if symptoms worsen or fail to improve.      If no improvement in symptoms or symptoms worsen, call/follow-up at clinic or go to ER.        30 minutes of total time spent on the encounter, which includes face to face time and non-face to face time preparing to see the patient (eg, review of tests), Obtaining and/or reviewing separately obtained history, Documenting clinical information in the electronic or other health record, Independently interpreting results (not separately reported) and communicating results to the patient/family/caregiver, or Care coordination (not separately reported).

## 2022-06-16 ENCOUNTER — LAB VISIT (OUTPATIENT)
Dept: PRIMARY CARE CLINIC | Facility: OTHER | Age: 22
End: 2022-06-16
Attending: INTERNAL MEDICINE
Payer: COMMERCIAL

## 2022-06-16 DIAGNOSIS — Z20.822 ENCOUNTER FOR LABORATORY TESTING FOR COVID-19 VIRUS: ICD-10-CM

## 2022-06-16 PROCEDURE — U0003 INFECTIOUS AGENT DETECTION BY NUCLEIC ACID (DNA OR RNA); SEVERE ACUTE RESPIRATORY SYNDROME CORONAVIRUS 2 (SARS-COV-2) (CORONAVIRUS DISEASE [COVID-19]), AMPLIFIED PROBE TECHNIQUE, MAKING USE OF HIGH THROUGHPUT TECHNOLOGIES AS DESCRIBED BY CMS-2020-01-R: HCPCS | Performed by: INTERNAL MEDICINE

## 2022-06-17 LAB
SARS-COV-2 RNA RESP QL NAA+PROBE: DETECTED
SARS-COV-2- CYCLE NUMBER: 25

## 2022-07-20 ENCOUNTER — PATIENT MESSAGE (OUTPATIENT)
Dept: GASTROENTEROLOGY | Facility: CLINIC | Age: 22
End: 2022-07-20
Payer: COMMERCIAL

## 2022-07-22 ENCOUNTER — LAB VISIT (OUTPATIENT)
Dept: LAB | Facility: HOSPITAL | Age: 22
End: 2022-07-22
Payer: COMMERCIAL

## 2022-07-22 ENCOUNTER — TELEPHONE (OUTPATIENT)
Dept: GASTROENTEROLOGY | Facility: CLINIC | Age: 22
End: 2022-07-22
Payer: COMMERCIAL

## 2022-07-22 DIAGNOSIS — A04.8 H. PYLORI INFECTION: ICD-10-CM

## 2022-07-22 PROCEDURE — 87338 HPYLORI STOOL AG IA: CPT

## 2022-07-22 NOTE — TELEPHONE ENCOUNTER
----- Message from Dulce Loza sent at 7/22/2022 11:20 AM CDT -----  Contact: Devan  Patient is calling to speak with a nurse, regarding if she can deliver her stool sample to a location in [place]. Please give patient a high priority  call back at [phone] as requested today.  Thanks,  LR

## 2022-07-26 ENCOUNTER — OFFICE VISIT (OUTPATIENT)
Dept: GASTROENTEROLOGY | Facility: CLINIC | Age: 22
End: 2022-07-26
Payer: COMMERCIAL

## 2022-07-26 VITALS
BODY MASS INDEX: 18.96 KG/M2 | SYSTOLIC BLOOD PRESSURE: 110 MMHG | WEIGHT: 117.94 LBS | HEART RATE: 73 BPM | HEIGHT: 66 IN | DIASTOLIC BLOOD PRESSURE: 78 MMHG

## 2022-07-26 DIAGNOSIS — K44.9 HIATAL HERNIA WITH GERD: Primary | ICD-10-CM

## 2022-07-26 DIAGNOSIS — K21.9 HIATAL HERNIA WITH GERD: Primary | ICD-10-CM

## 2022-07-26 DIAGNOSIS — A04.8 H. PYLORI INFECTION: ICD-10-CM

## 2022-07-26 PROCEDURE — 1160F PR REVIEW ALL MEDS BY PRESCRIBER/CLIN PHARMACIST DOCUMENTED: ICD-10-PCS | Mod: CPTII,S$GLB,, | Performed by: NURSE PRACTITIONER

## 2022-07-26 PROCEDURE — 1159F MED LIST DOCD IN RCRD: CPT | Mod: CPTII,S$GLB,, | Performed by: NURSE PRACTITIONER

## 2022-07-26 PROCEDURE — 3074F SYST BP LT 130 MM HG: CPT | Mod: CPTII,S$GLB,, | Performed by: NURSE PRACTITIONER

## 2022-07-26 PROCEDURE — 99214 OFFICE O/P EST MOD 30 MIN: CPT | Mod: S$GLB,,, | Performed by: NURSE PRACTITIONER

## 2022-07-26 PROCEDURE — 3074F PR MOST RECENT SYSTOLIC BLOOD PRESSURE < 130 MM HG: ICD-10-PCS | Mod: CPTII,S$GLB,, | Performed by: NURSE PRACTITIONER

## 2022-07-26 PROCEDURE — 99999 PR PBB SHADOW E&M-EST. PATIENT-LVL III: CPT | Mod: PBBFAC,,, | Performed by: NURSE PRACTITIONER

## 2022-07-26 PROCEDURE — 3008F BODY MASS INDEX DOCD: CPT | Mod: CPTII,S$GLB,, | Performed by: NURSE PRACTITIONER

## 2022-07-26 PROCEDURE — 99214 PR OFFICE/OUTPT VISIT, EST, LEVL IV, 30-39 MIN: ICD-10-PCS | Mod: S$GLB,,, | Performed by: NURSE PRACTITIONER

## 2022-07-26 PROCEDURE — 3078F DIAST BP <80 MM HG: CPT | Mod: CPTII,S$GLB,, | Performed by: NURSE PRACTITIONER

## 2022-07-26 PROCEDURE — 3078F PR MOST RECENT DIASTOLIC BLOOD PRESSURE < 80 MM HG: ICD-10-PCS | Mod: CPTII,S$GLB,, | Performed by: NURSE PRACTITIONER

## 2022-07-26 PROCEDURE — 1160F RVW MEDS BY RX/DR IN RCRD: CPT | Mod: CPTII,S$GLB,, | Performed by: NURSE PRACTITIONER

## 2022-07-26 PROCEDURE — 3008F PR BODY MASS INDEX (BMI) DOCUMENTED: ICD-10-PCS | Mod: CPTII,S$GLB,, | Performed by: NURSE PRACTITIONER

## 2022-07-26 PROCEDURE — 1159F PR MEDICATION LIST DOCUMENTED IN MEDICAL RECORD: ICD-10-PCS | Mod: CPTII,S$GLB,, | Performed by: NURSE PRACTITIONER

## 2022-07-26 PROCEDURE — 99999 PR PBB SHADOW E&M-EST. PATIENT-LVL III: ICD-10-PCS | Mod: PBBFAC,,, | Performed by: NURSE PRACTITIONER

## 2022-07-26 NOTE — PROGRESS NOTES
Clinic Consult:  [facility] Consultation Note    Reason for Consult:  The primary encounter diagnosis was Hiatal hernia with GERD. A diagnosis of H. pylori infection was also pertinent to this visit.    PCP: Tiffany Miller       HPI:  This is a 22 y.o. female here for evaluation of the above.   She has established with [facility].  She is currently living in [place] for school.  She had EGD in 2/2022 with findings mild Schatzki ring that was dilated; medium-sized hiatal hernia; and H pylori gastritis.  She was treated with clarithromycin triple therapy.  She has submitted a stool sample for repeat testing (while being off of PPI for appropriate time).  Result is pending.  She had been doing well off of omeprazole 20 mg daily, but started with increase of symptoms after being prescribed doxycycline for vaginal infection.  She states that she felt like her stomach was coming through her mouth.  She has had resolution of this since discontinuing doxycycline.  However, she does report breakthrough GERD symptoms about once a week.    Review of Systems   Constitutional: Negative for chills, fever and weight loss.   Respiratory: Negative for cough.    Cardiovascular: Negative for chest pain and palpitations.   Gastrointestinal: Positive for heartburn. Negative for abdominal pain, blood in stool, constipation, diarrhea, melena, nausea and vomiting.       Medical History:  has no past medical history on file.    Surgical History:  has a past surgical history that includes Esophagogastroduodenoscopy (N/A, 2/18/2022) and Upper gastrointestinal endoscopy.    Family History: family history is not on file..     Social History:  reports that she has never smoked. She has never used smokeless tobacco. She reports that she does not drink alcohol and does not use drugs.    Allergies: Reviewed    Home Medications:   Current Outpatient Medications on File Prior to Visit   Medication Sig Dispense Refill  "   desog-e.estradioL/e.estradioL (KARIVA) 0.15-0.02 mgx21 /0.01 mg x 5 per tablet Take 1 tablet by mouth once daily.      omeprazole (PRILOSEC) 20 MG capsule Take 1 capsule (20 mg total) by mouth once daily. 30 capsule 2     No current facility-administered medications on file prior to visit.       Physical Exam:  /78 (BP Location: Left arm, Patient Position: Sitting, BP Method: Medium (Manual))   Pulse 73   Ht 5' 6" (1.676 m)   Wt 53.5 kg (117 lb 15.1 oz)   LMP 07/05/2022 (Approximate)   BMI 19.04 kg/m²   Body mass index is 19.04 kg/m².  Physical Exam  Constitutional:       General: She is not in acute distress.  HENT:      Head: Normocephalic and atraumatic.   Eyes:      General: No scleral icterus.     Conjunctiva/sclera: Conjunctivae normal.   Cardiovascular:      Rate and Rhythm: Normal rate and regular rhythm.      Heart sounds: No murmur heard.  Pulmonary:      Effort: Pulmonary effort is normal. No respiratory distress.      Breath sounds: Normal breath sounds. No wheezing.   Abdominal:      General: Abdomen is flat. Bowel sounds are normal.      Palpations: Abdomen is soft.      Tenderness: There is no abdominal tenderness.   Skin:     General: Skin is warm and dry.   Neurological:      General: No focal deficit present.      Mental Status: She is alert and oriented to person, place, and time.      Cranial Nerves: No cranial nerve deficit.   Psychiatric:         Mood and Affect: Mood normal.         Judgment: Judgment normal.         Labs: Pertinent labs reviewed.  CRC Screening: NA    Assessment:  1. Hiatal hernia with GERD    2. H. pylori infection      Recommendations:   - await H. Pylori results.   - restart omeprazole 20 mg daily to prevent breakthrough symptoms.   - we discussed that surgical management is an option if she desires.     Hiatal hernia with GERD    H. pylori infection        Follow up if symptoms worsen or fail to improve.    Thank you so much for allowing me to participate " in the care of RAHUL Chong

## 2022-07-26 NOTE — PATIENT INSTRUCTIONS
Lifestyle modifications  for acid reflux/GERD    These include proper elevation of the head of the bed, eating small frequent meals, avoiding foods that trigger reflux, avoiding late meals and staying upright for at least 1.5 hours after meals.    Foods that trigger reflux and should be avoided:  - citrus foods such as tomato based products/red sauces.   - citrus drinks such as orange juice  - spicy foods  - high fat foods  - alcohol  - caffeine  - chocolate  - spearmint/peppermint

## 2022-07-28 ENCOUNTER — OFFICE VISIT (OUTPATIENT)
Dept: DERMATOLOGY | Facility: CLINIC | Age: 22
End: 2022-07-28
Payer: COMMERCIAL

## 2022-07-28 DIAGNOSIS — B07.9 VERRUCA VULGARIS: Primary | ICD-10-CM

## 2022-07-28 LAB
H PYLORI AG STL QL IA: NOT DETECTED
SPECIMEN SOURCE: NORMAL

## 2022-07-28 PROCEDURE — 17110 DESTRUCTION B9 LES UP TO 14: CPT | Mod: S$GLB,,, | Performed by: STUDENT IN AN ORGANIZED HEALTH CARE EDUCATION/TRAINING PROGRAM

## 2022-07-28 PROCEDURE — 99999 PR PBB SHADOW E&M-EST. PATIENT-LVL III: CPT | Mod: PBBFAC,,, | Performed by: STUDENT IN AN ORGANIZED HEALTH CARE EDUCATION/TRAINING PROGRAM

## 2022-07-28 PROCEDURE — 1160F RVW MEDS BY RX/DR IN RCRD: CPT | Mod: CPTII,S$GLB,, | Performed by: STUDENT IN AN ORGANIZED HEALTH CARE EDUCATION/TRAINING PROGRAM

## 2022-07-28 PROCEDURE — 99999 PR PBB SHADOW E&M-EST. PATIENT-LVL III: ICD-10-PCS | Mod: PBBFAC,,, | Performed by: STUDENT IN AN ORGANIZED HEALTH CARE EDUCATION/TRAINING PROGRAM

## 2022-07-28 PROCEDURE — 1159F PR MEDICATION LIST DOCUMENTED IN MEDICAL RECORD: ICD-10-PCS | Mod: CPTII,S$GLB,, | Performed by: STUDENT IN AN ORGANIZED HEALTH CARE EDUCATION/TRAINING PROGRAM

## 2022-07-28 PROCEDURE — 1160F PR REVIEW ALL MEDS BY PRESCRIBER/CLIN PHARMACIST DOCUMENTED: ICD-10-PCS | Mod: CPTII,S$GLB,, | Performed by: STUDENT IN AN ORGANIZED HEALTH CARE EDUCATION/TRAINING PROGRAM

## 2022-07-28 PROCEDURE — 99499 UNLISTED E&M SERVICE: CPT | Mod: S$GLB,,, | Performed by: STUDENT IN AN ORGANIZED HEALTH CARE EDUCATION/TRAINING PROGRAM

## 2022-07-28 PROCEDURE — 17110 PR DESTRUCTION BENIGN LESIONS UP TO 14: ICD-10-PCS | Mod: S$GLB,,, | Performed by: STUDENT IN AN ORGANIZED HEALTH CARE EDUCATION/TRAINING PROGRAM

## 2022-07-28 PROCEDURE — 1159F MED LIST DOCD IN RCRD: CPT | Mod: CPTII,S$GLB,, | Performed by: STUDENT IN AN ORGANIZED HEALTH CARE EDUCATION/TRAINING PROGRAM

## 2022-07-28 PROCEDURE — 99499 NO LOS: ICD-10-PCS | Mod: S$GLB,,, | Performed by: STUDENT IN AN ORGANIZED HEALTH CARE EDUCATION/TRAINING PROGRAM

## 2022-07-28 NOTE — PROGRESS NOTES
Patient Information  Name: Devan Selby  : 2000  MRN: [id number]     Referring Physician:  Dr. Martinez ref. provider found   Primary Care Physician:  Dr. Tiffany Miller MD   Date of Visit: 2022      Subjective:       Devan Selby is a 22 y.o. female who presents for   Chief Complaint   Patient presents with    Warts     Patient states that the spots are coming back post LN treatment to he left knee.      HPI  Patient with hx of verruca vulgaris, here for follow up. Last time had LN2 with improvement.     Patient was last seen:2022     Prior notes by myself reviewed.   Clinical documentation obtained by nursing staff reviewed.    Review of Systems   Skin: Negative for itching and rash.        Objective:    Physical Exam   Constitutional: She appears well-developed and well-nourished. No distress.   Neurological: She is alert and oriented to person, place, and time. She is not disoriented.   Psychiatric: She has a normal mood and affect.   Skin:   Areas Examined (abnormalities noted in diagram):   LLE Inspection Performed              Diagram Legend     Erythematous scaling macule/papule c/w actinic keratosis       Vascular papule c/w angioma      Pigmented verrucoid papule/plaque c/w seborrheic keratosis      Yellow umbilicated papule c/w sebaceous hyperplasia      Irregularly shaped tan macule c/w lentigo     1-2 mm smooth white papules consistent with Milia      Movable subcutaneous cyst with punctum c/w epidermal inclusion cyst      Subcutaneous movable cyst c/w pilar cyst      Firm pink to brown papule c/w dermatofibroma      Pedunculated fleshy papule(s) c/w skin tag(s)      Evenly pigmented macule c/w junctional nevus     Mildly variegated pigmented, slightly irregular-bordered macule c/w mildly atypical nevus      Flesh colored to evenly pigmented papule c/w intradermal nevus       Pink pearly papule/plaque c/w basal cell carcinoma      Erythematous hyperkeratotic cursted plaque  c/w SCC      Surgical scar with no sign of skin cancer recurrence      Open and closed comedones      Inflammatory papules and pustules      Verrucoid papule consistent consistent with wart     Erythematous eczematous patches and plaques     Dystrophic onycholytic nail with subungual debris c/w onychomycosis     Umbilicated papule    Erythematous-base heme-crusted tan verrucoid plaque consistent with inflamed seborrheic keratosis     Erythematous Silvery Scaling Plaque c/w Psoriasis     See annotation      No images are attached to the encounter or orders placed in the encounter.    [] Data reviewed  [] Independent review of test  [] Management discussed with another provider    Assessment / Plan:        Verruca vulgaris  Cryosurgery procedure note:    Verbal consent from the patient is obtained including, but not limited to, risk of hypopigmentation/hyperpigmentation, scar, recurrence of lesion. Liquid nitrogen cryosurgery is applied to 6 verruca with prior paring, as detailed in the physical exam, to produce a freeze injury. 2 consecutive freeze thaw cycles are applied to each verruca. The patient is aware that blisters (possibly blood blisters) may form.           LOS NUMBER AND COMPLEXITY OF PROBLEMS    COMPLEXITY OF DATA RISK TOTAL TIME (m)   50398  67820 [] 1 self-limited or minor problem [] Minimal to none [] No treatment recommended or patient to monitor 15-29  10-19   67333  48067 Low  [] 2 or > self limited or minor problems  [] 1 stable chronic illness  [] 1 acute, uncomplicated illness or injury Limited (2)  [] Prior external notes from each unique source  [] Review result of each unique test  [] Order each unique test []  Low  OTC medications, minor skin biopsy 30-44 20-29   70025  21505 Moderate  []  1 or > chronic illness with progression, exacerbation or SE of treatment  []  2 or more stable chronic illnesses  []  1 acute illness with systemic symptoms  []  1 acute complicated injury  []  1  undiagnosed new problem with uncertain prognosis Moderate (1/3 below)  []  3 or more data items        *Now includes assessment requiring independent historian  []  Independent interpretation of a test  []  Discuss management/test with another provider Moderate  []  Prescription drug mgmt  []  Minor surgery with risk discussed  []  Mgmt limited by social determinates 45-59  30-39   15336  09428 High  []  1 or more chronic illness with severe exacerbation, progression or SE of treatment  []  1 acute or chronic illness/injury that poses a threat to life or bodily function Extensive (2/3 below)  []  3 or more data items        *Now includes assessment requiring independent historian.  []  Independent interpretation of a test  []  Discuss management/test with another provider High  []  Major surgery with risk discussed  []  Drug therapy requiring intensive monitoring for toxicity  []  Hospitalization  []  Decision for DNR 60-74  40-54      No follow-ups on file.    Lizett Shultz MD, FAAD  [facility]

## 2023-01-05 ENCOUNTER — PATIENT MESSAGE (OUTPATIENT)
Dept: GASTROENTEROLOGY | Facility: CLINIC | Age: 23
End: 2023-01-05
Payer: COMMERCIAL

## 2023-05-24 DIAGNOSIS — R11.0 NAUSEA: ICD-10-CM

## 2023-05-24 RX ORDER — ONDANSETRON 4 MG/1
TABLET, ORALLY DISINTEGRATING ORAL
Qty: 60 TABLET | Refills: 0 | Status: SHIPPED | OUTPATIENT
Start: 2023-05-24 | End: 2023-10-31 | Stop reason: SDUPTHER

## 2023-06-02 ENCOUNTER — PATIENT MESSAGE (OUTPATIENT)
Dept: GASTROENTEROLOGY | Facility: CLINIC | Age: 23
End: 2023-06-02
Payer: COMMERCIAL

## 2023-06-13 ENCOUNTER — PATIENT MESSAGE (OUTPATIENT)
Dept: RESEARCH | Facility: HOSPITAL | Age: 23
End: 2023-06-13
Payer: COMMERCIAL

## 2023-08-07 ENCOUNTER — OFFICE VISIT (OUTPATIENT)
Dept: GASTROENTEROLOGY | Facility: CLINIC | Age: 23
End: 2023-08-07
Payer: COMMERCIAL

## 2023-08-07 ENCOUNTER — TELEPHONE (OUTPATIENT)
Dept: GASTROENTEROLOGY | Facility: CLINIC | Age: 23
End: 2023-08-07
Payer: COMMERCIAL

## 2023-08-07 VITALS
WEIGHT: 128.5 LBS | BODY MASS INDEX: 20.65 KG/M2 | HEART RATE: 62 BPM | DIASTOLIC BLOOD PRESSURE: 76 MMHG | SYSTOLIC BLOOD PRESSURE: 125 MMHG | HEIGHT: 66 IN

## 2023-08-07 DIAGNOSIS — R10.9 ABDOMINAL CRAMPING: ICD-10-CM

## 2023-08-07 DIAGNOSIS — Z86.19 HISTORY OF HELICOBACTER PYLORI INFECTION: ICD-10-CM

## 2023-08-07 DIAGNOSIS — R11.0 NAUSEA: Primary | ICD-10-CM

## 2023-08-07 DIAGNOSIS — K57.90 DIVERTICULOSIS: ICD-10-CM

## 2023-08-07 DIAGNOSIS — K59.00 CONSTIPATION, UNSPECIFIED CONSTIPATION TYPE: ICD-10-CM

## 2023-08-07 DIAGNOSIS — R19.8 IRREGULAR BOWEL HABITS: ICD-10-CM

## 2023-08-07 DIAGNOSIS — K21.9 GASTROESOPHAGEAL REFLUX DISEASE WITHOUT ESOPHAGITIS: ICD-10-CM

## 2023-08-07 DIAGNOSIS — K44.9 HIATAL HERNIA: ICD-10-CM

## 2023-08-07 PROCEDURE — 1160F RVW MEDS BY RX/DR IN RCRD: CPT | Mod: CPTII,S$GLB,,

## 2023-08-07 PROCEDURE — 3074F PR MOST RECENT SYSTOLIC BLOOD PRESSURE < 130 MM HG: ICD-10-PCS | Mod: CPTII,S$GLB,,

## 2023-08-07 PROCEDURE — 3078F PR MOST RECENT DIASTOLIC BLOOD PRESSURE < 80 MM HG: ICD-10-PCS | Mod: CPTII,S$GLB,,

## 2023-08-07 PROCEDURE — 1160F PR REVIEW ALL MEDS BY PRESCRIBER/CLIN PHARMACIST DOCUMENTED: ICD-10-PCS | Mod: CPTII,S$GLB,,

## 2023-08-07 PROCEDURE — 3008F BODY MASS INDEX DOCD: CPT | Mod: CPTII,S$GLB,,

## 2023-08-07 PROCEDURE — 3078F DIAST BP <80 MM HG: CPT | Mod: CPTII,S$GLB,,

## 2023-08-07 PROCEDURE — 99999 PR PBB SHADOW E&M-EST. PATIENT-LVL IV: CPT | Mod: PBBFAC,,,

## 2023-08-07 PROCEDURE — 99214 OFFICE O/P EST MOD 30 MIN: CPT | Mod: S$GLB,,,

## 2023-08-07 PROCEDURE — 1159F PR MEDICATION LIST DOCUMENTED IN MEDICAL RECORD: ICD-10-PCS | Mod: CPTII,S$GLB,,

## 2023-08-07 PROCEDURE — 3008F PR BODY MASS INDEX (BMI) DOCUMENTED: ICD-10-PCS | Mod: CPTII,S$GLB,,

## 2023-08-07 PROCEDURE — 99214 PR OFFICE/OUTPT VISIT, EST, LEVL IV, 30-39 MIN: ICD-10-PCS | Mod: S$GLB,,,

## 2023-08-07 PROCEDURE — 3074F SYST BP LT 130 MM HG: CPT | Mod: CPTII,S$GLB,,

## 2023-08-07 PROCEDURE — 99999 PR PBB SHADOW E&M-EST. PATIENT-LVL IV: ICD-10-PCS | Mod: PBBFAC,,,

## 2023-08-07 PROCEDURE — 1159F MED LIST DOCD IN RCRD: CPT | Mod: CPTII,S$GLB,,

## 2023-08-07 NOTE — PROGRESS NOTES
Subjective:       Patient ID: Devan Selby is a 23 y.o. female Body mass index is 20.74 kg/m².    Chief Complaint: Nausea    Established patient of DANIELITO Love, & myself.     Gastroesophageal Reflux  She complains of abdominal pain (Denies pain currently; Reports intermittent periumbilical and lower abdominal cramps/pressure that is worse at night, but is also spontaneous), heartburn (Improved since identifying triggers, which she tries to avoid) and nausea (chief complaint: daily typically in the evening; improved since being treated for H pylori infection; takes Zofran p.r.n. with improvement; takes herbal tea with mild improvement; other triggers include fried foods; pt denies possibily of being pregnant). She reports no belching, no chest pain, no choking, no coughing, no dysphagia, no early satiety, no globus sensation, no hoarse voice, no sore throat or no water brash. This is a chronic problem. The current episode started more than 1 year ago. The problem occurs rarely. The problem has been resolved (resolved with dietary changes). The heartburn duration is an hour (at night). The heartburn is located in the substernum. The heartburn is of mild intensity. The heartburn does not wake her from sleep. The heartburn does not limit her activity. The heartburn doesn't change with position. The symptoms are aggravated by certain foods and ETOH (fried foods). Pertinent negatives include no anemia, fatigue, melena, muscle weakness or weight loss. Patient also reports abdominal bloating.  She is currently having only 2-3 BMs a week rated stool 2 and 4 on Falls scale; she denies straining and feels empty after BMs. Risk factors include hiatal hernia and ETOH use. She has tried a PPI, a diet change and an herbal remedy (Past treatments: Omeprazole 40 mg once daily, but stopped because she felt like it was making her hands shake) for the symptoms. The treatment provided significant relief. Past  procedures include an EGD (EGD 02/18/22 - Normal esophagus. Z-line regular, 40 cm from the incisors. Mild Schatzki ring. Dilated. Medium-sized hiatal hernia. Erythematous mucosa in the antrum. Normal examined duodenum; patho: positive for h. Pylori; 07/22/22 h. pylori stool negative). Past procedures do not include an abdominal ultrasound, esophageal manometry, esophageal pH monitoring, H. pylori antibody titer or a UGI. Past invasive treatments do not include gastroplasty, gastroplication or reflux surgery.     Review of Systems   Constitutional:  Negative for activity change, appetite change, chills, diaphoresis, fatigue, fever, unexpected weight change and weight loss.   HENT:  Negative for hoarse voice, sore throat and trouble swallowing.    Respiratory:  Negative for cough, choking and shortness of breath.    Cardiovascular:  Negative for chest pain.   Gastrointestinal:  Positive for abdominal pain (Denies pain currently; Reports intermittent periumbilical and lower abdominal cramps/pressure that is worse at night, but is also spontaneous), heartburn (Improved since identifying triggers, which she tries to avoid) and nausea (chief complaint: daily typically in the evening; improved since being treated for H pylori infection; takes Zofran p.r.n. with improvement; takes herbal tea with mild improvement; other triggers include fried foods; pt denies possibily of being pregnant). Negative for abdominal distention, anal bleeding, blood in stool, constipation, diarrhea, dysphagia, melena, rectal pain and vomiting.   Musculoskeletal:  Negative for muscle weakness.       No LMP recorded.  History reviewed. No pertinent past medical history.  Past Surgical History:   Procedure Laterality Date    ESOPHAGOGASTRODUODENOSCOPY N/A 2/18/2022    Procedure: EGD (ESOPHAGOGASTRODUODENOSCOPY);  Surgeon: Coleman Paredes MD;  Location: [facility];  Service: Endoscopy;  Laterality: N/A;    UPPER GASTROINTESTINAL ENDOSCOPY        Family History   Problem Relation Age of Onset    Colon cancer Neg Hx     Colon polyps Neg Hx     Crohn's disease Neg Hx     Ulcerative colitis Neg Hx     Stomach cancer Neg Hx     Rectal cancer Neg Hx      Social History     Tobacco Use    Smoking status: Never    Smokeless tobacco: Never   Substance Use Topics    Alcohol use: Yes     Comment: occ    Drug use: No     Wt Readings from Last 10 Encounters:   08/07/23 58.3 kg (128 lb 8.5 oz)   07/26/22 53.5 kg (117 lb 15.1 oz)   06/06/22 53.8 kg (118 lb 9.7 oz)   02/18/22 54.4 kg (120 lb)   02/14/22 54 kg (119 lb 0.8 oz)   01/28/22 54.4 kg (119 lb 14.9 oz)   04/09/19 54.4 kg (120 lb) (43 %, Z= -0.18)*   02/25/19 54.4 kg (120 lb) (43 %, Z= -0.17)*     * Growth percentiles are based on [facility] (Girls, 2-20 Years) data.     Lab Results   Component Value Date    WBC 4.88 01/28/2022    HGB 13.5 01/28/2022    HCT 42.0 01/28/2022    MCV 91 01/28/2022     01/28/2022     CMP  Sodium   Date Value Ref Range Status   01/28/2022 137 136 - 145 mmol/L Final     Potassium   Date Value Ref Range Status   01/28/2022 4.6 3.5 - 5.1 mmol/L Final     Chloride   Date Value Ref Range Status   01/28/2022 106 95 - 110 mmol/L Final     CO2   Date Value Ref Range Status   01/28/2022 22 (L) 23 - 29 mmol/L Final     Glucose   Date Value Ref Range Status   01/28/2022 80 70 - 110 mg/dL Final     BUN   Date Value Ref Range Status   01/28/2022 13 6 - 20 mg/dL Final     Creatinine   Date Value Ref Range Status   01/28/2022 0.9 0.5 - 1.4 mg/dL Final     Calcium   Date Value Ref Range Status   01/28/2022 9.9 8.7 - 10.5 mg/dL Final     Total Protein   Date Value Ref Range Status   01/28/2022 8.1 6.0 - 8.4 g/dL Final     Albumin   Date Value Ref Range Status   01/28/2022 4.2 3.5 - 5.2 g/dL Final     Total Bilirubin   Date Value Ref Range Status   01/28/2022 0.4 0.1 - 1.0 mg/dL Final     Comment:     For infants and newborns, interpretation of results should be based  on gestational age, weight and in  agreement with clinical  observations.    Premature Infant recommended reference ranges:  Up to 24 hours.............<8.0 mg/dL  Up to 48 hours............<12.0 mg/dL  3-5 days..................<15.0 mg/dL  6-29 days.................<15.0 mg/dL       Alkaline Phosphatase   Date Value Ref Range Status   01/28/2022 40 (L) 55 - 135 U/L Final     AST   Date Value Ref Range Status   01/28/2022 32 10 - 40 U/L Final     ALT   Date Value Ref Range Status   01/28/2022 17 10 - 44 U/L Final     Anion Gap   Date Value Ref Range Status   01/28/2022 9 8 - 16 mmol/L Final     eGFR if    Date Value Ref Range Status   01/28/2022 >60.0 >60 mL/min/1.73 m^2 Final     eGFR if non    Date Value Ref Range Status   01/28/2022 >60.0 >60 mL/min/1.73 m^2 Final     Comment:     Calculation used to obtain the estimated glomerular filtration  rate (eGFR) is the CKD-EPI equation.        Lab Results   Component Value Date    AMYLASE 142 (H) 01/28/2022     Lab Results   Component Value Date    LIPASE 29 01/28/2022     Reviewed prior medical records including radiology report of abdominal CT 02/14/22 & endoscopy history of EGD 02/18/22 (see surgical history).    Objective:      Physical Exam  Vitals and nursing note reviewed.   Constitutional:       General: She is not in acute distress.     Appearance: Normal appearance. She is normal weight. She is not ill-appearing.   HENT:      Mouth/Throat:      Lips: Pink. No lesions.      Comments: -  Eyes:      Extraocular Movements: Extraocular movements intact.      Pupils: Pupils are equal, round, and reactive to light.   Cardiovascular:      Rate and Rhythm: Normal rate and regular rhythm.   Pulmonary:      Effort: Pulmonary effort is normal. No respiratory distress.      Breath sounds: Normal breath sounds.   Abdominal:      General: Abdomen is flat. Bowel sounds are normal. There is no distension or abdominal bruit. There are no signs of injury.      Palpations: Abdomen  is soft. There is no shifting dullness, fluid wave, hepatomegaly, splenomegaly or mass.      Tenderness: There is abdominal tenderness (mild discomfort) in the right lower quadrant and left lower quadrant. There is no guarding or rebound. Negative signs include No's sign, Rovsing's sign and McBurney's sign.      Hernia: No hernia is present.   Skin:     General: Skin is warm and dry.      Coloration: Skin is not jaundiced or pale.   Neurological:      Mental Status: She is alert and oriented to person, place, and time.   Psychiatric:         Attention and Perception: Attention normal.         Mood and Affect: Mood normal.         Speech: Speech normal.         Behavior: Behavior normal.         Assessment:       1. Nausea    2. Gastroesophageal reflux disease without esophagitis    3. Hiatal hernia    4. Constipation, unspecified constipation type    5. Irregular bowel habits    6. Abdominal cramping    7. Diverticulosis    8. History of Helicobacter pylori infection         Plan:       Nausea  -     NM Gastric Emptying; Future; Expected date: 08/07/2023  -      Abdomen Limited (GALLBLADDER); Future; Expected date: 08/07/2023  -continue Zofran as prescribed  -avoid known triggers    Gastroesophageal reflux disease without esophagitis  -Educated patient on lifestyle modifications to help control/reduce reflux/abdominal pain including: avoid large meals, avoid eating within 2-3 hours of bedtime (avoid late night eating & lying down soon after eating), elevate head of bed if nocturnal symptoms are present, smoking cessation (if current smoker), & weight loss (if overweight).   -Educated to avoid known foods which trigger reflux symptoms & to minimize/avoid high-fat foods, chocolate, caffeine, citrus, alcohol, & tomato products.  -Advised to avoid/limit use of NSAID's, since they can cause GI upset, bleeding, and/or ulcers. If needed, take with food.   -May take Tums p.r.n. for breakthrough heartburn    Hiatal  hernia  -usually managed by controlling reflux symptoms, surgery is an option, but usually performed if reflux is uncontrolled by medication management and lifestyle/dietary modifications; if symptoms persist despite medication management and lifestyle/dietary modifications, we can refer to general surgery to consult about surgical options    Constipation, unspecified constipation type  - schedule Colonoscopy, discussed procedure with the patient, including risks and benefits, patient verbalized understanding  -Recommend daily exercise as tolerated, adequate water intake (six 8-oz glasses of water daily), and high fiber diet. OTC fiber supplements are recommended if diet does not reach daily fiber goal (20-30 grams daily), such as Metamucil, Citrucel, or FiberCon (take as directed, separate from other oral medications by >2 hours).  -Recommend trying OTC MiraLax once daily (17g PO) as directed  -If no improvement with above recommendations, try intermittently dosed Dulcolax OTC as directed (every 3-4 days) PRN to facilitate bowel movements  -If no relief with this, consider adding a emollient laxative (castor oil or mineral oil) +/- enema  -If still no improvement with these measures, call/follow-up    Irregular bowel habits  - schedule Colonoscopy, discussed procedure with the patient, including risks and benefits, patient verbalized understanding    Abdominal cramping  - schedule Colonoscopy, discussed procedure with the patient, including risks and benefits, patient verbalized understanding  -trial of OTC Ibgard/Fdgard as directed on packaging    Diverticulosis  -Recommend high fiber diet (20-30 grams of fiber daily)/OTC fiber supplements daily as directed.    History of Helicobacter pylori infection  -resolved after treatment; stool recheck negative 07/22/22    Follow up in about 4 weeks (around 9/4/2023), or if symptoms worsen or fail to improve.      If no improvement in symptoms or symptoms worsen,  call/follow-up at clinic or go to ER.        30 minutes of total time spent on the encounter, which includes face to face time and non-face to face time preparing to see the patient (eg, review of tests), Obtaining and/or reviewing separately obtained history, Documenting clinical information in the electronic or other health record, Independently interpreting results (not separately reported) and communicating results to the patient/family/caregiver, or Care coordination (not separately reported).

## 2023-08-07 NOTE — TELEPHONE ENCOUNTER
----- Message from Roma Sparks sent at 8/7/2023 11:01 AM CDT -----  Contact: pt [phone]  Type: Needs Medical Advice  Who Called:  Pt   Best Call Back Number: [phone]    Additional Information: Pt has appt today at 2:30pm She is requesting to come in earlier today if possible. Pls call back and advise

## 2023-08-07 NOTE — TELEPHONE ENCOUNTER
Call placed to pt, spoke with her mother Ingrid in regards to sooner time for appt today. Offered her 1p. She accepted. No further issues noted

## 2023-08-09 ENCOUNTER — OFFICE VISIT (OUTPATIENT)
Dept: FAMILY MEDICINE | Facility: CLINIC | Age: 23
End: 2023-08-09
Payer: COMMERCIAL

## 2023-08-09 VITALS
DIASTOLIC BLOOD PRESSURE: 72 MMHG | HEIGHT: 66 IN | WEIGHT: 130.94 LBS | OXYGEN SATURATION: 99 % | SYSTOLIC BLOOD PRESSURE: 118 MMHG | HEART RATE: 61 BPM | BODY MASS INDEX: 21.04 KG/M2

## 2023-08-09 DIAGNOSIS — Z00.00 WELLNESS EXAMINATION: Primary | ICD-10-CM

## 2023-08-09 DIAGNOSIS — G25.0 BENIGN ESSENTIAL TREMOR: ICD-10-CM

## 2023-08-09 PROCEDURE — 3078F PR MOST RECENT DIASTOLIC BLOOD PRESSURE < 80 MM HG: ICD-10-PCS | Mod: CPTII,S$GLB,, | Performed by: FAMILY MEDICINE

## 2023-08-09 PROCEDURE — 99395 PR PREVENTIVE VISIT,EST,18-39: ICD-10-PCS | Mod: EP,S$GLB,, | Performed by: FAMILY MEDICINE

## 2023-08-09 PROCEDURE — 99999 PR PBB SHADOW E&M-EST. PATIENT-LVL III: ICD-10-PCS | Mod: PBBFAC,,, | Performed by: FAMILY MEDICINE

## 2023-08-09 PROCEDURE — 3078F DIAST BP <80 MM HG: CPT | Mod: CPTII,S$GLB,, | Performed by: FAMILY MEDICINE

## 2023-08-09 PROCEDURE — 1160F RVW MEDS BY RX/DR IN RCRD: CPT | Mod: CPTII,S$GLB,, | Performed by: FAMILY MEDICINE

## 2023-08-09 PROCEDURE — 1159F PR MEDICATION LIST DOCUMENTED IN MEDICAL RECORD: ICD-10-PCS | Mod: CPTII,S$GLB,, | Performed by: FAMILY MEDICINE

## 2023-08-09 PROCEDURE — 3074F PR MOST RECENT SYSTOLIC BLOOD PRESSURE < 130 MM HG: ICD-10-PCS | Mod: CPTII,S$GLB,, | Performed by: FAMILY MEDICINE

## 2023-08-09 PROCEDURE — 1159F MED LIST DOCD IN RCRD: CPT | Mod: CPTII,S$GLB,, | Performed by: FAMILY MEDICINE

## 2023-08-09 PROCEDURE — 3008F BODY MASS INDEX DOCD: CPT | Mod: CPTII,S$GLB,, | Performed by: FAMILY MEDICINE

## 2023-08-09 PROCEDURE — 99999 PR PBB SHADOW E&M-EST. PATIENT-LVL III: CPT | Mod: PBBFAC,,, | Performed by: FAMILY MEDICINE

## 2023-08-09 PROCEDURE — 99395 PREV VISIT EST AGE 18-39: CPT | Mod: EP,S$GLB,, | Performed by: FAMILY MEDICINE

## 2023-08-09 PROCEDURE — 3008F PR BODY MASS INDEX (BMI) DOCUMENTED: ICD-10-PCS | Mod: CPTII,S$GLB,, | Performed by: FAMILY MEDICINE

## 2023-08-09 PROCEDURE — 3074F SYST BP LT 130 MM HG: CPT | Mod: CPTII,S$GLB,, | Performed by: FAMILY MEDICINE

## 2023-08-09 PROCEDURE — 1160F PR REVIEW ALL MEDS BY PRESCRIBER/CLIN PHARMACIST DOCUMENTED: ICD-10-PCS | Mod: CPTII,S$GLB,, | Performed by: FAMILY MEDICINE

## 2023-08-09 NOTE — PROGRESS NOTES
"Subjective:       Patient ID: Devan Selby is a 23 y.o. female.    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  She does need hep B vaccines.  She also has a need for tetanus booster finishing the COVID-19 series if she chooses she noted a tremor a year ago January extremely fine tremor while she is on medication for H pylori treatment.  Cause is unclear but it comes and goes.  It is not a problem at this point with 80 else.  Suggest that we do some labs especially checked for hyperthyroidism.  She is had no recent extreme weight loss and vital signs are stable    Review of Systems   Constitutional: Negative.    HENT: Negative.     Eyes: Negative.    Respiratory: Negative.     Cardiovascular: Negative.    Gastrointestinal: Negative.    Endocrine: Negative.    Genitourinary: Negative.    Musculoskeletal: Negative.    Skin: Negative.    Allergic/Immunologic: Negative.    Neurological:  Positive for tremors.   Hematological: Negative.    Psychiatric/Behavioral: Negative.         Objective:      Vitals:    08/09/23 1334   BP: 118/72   Pulse: 61   SpO2: 99%   Weight: 59.4 kg (130 lb 15.3 oz)   Height: 5' 6" (1.676 m)      Physical Exam  Vitals and nursing note reviewed.   Constitutional:       Appearance: Normal appearance. She is normal weight.   HENT:      Head: Normocephalic and atraumatic.      Nose: Nose normal.      Mouth/Throat:      Mouth: Mucous membranes are moist.      Pharynx: Oropharynx is clear.   Eyes:      Extraocular Movements: Extraocular movements intact.      Conjunctiva/sclera: Conjunctivae normal.      Pupils: Pupils are equal, round, and reactive to light.   Cardiovascular:      Rate and Rhythm: Normal rate and regular rhythm.      Pulses: Normal pulses.      Heart sounds: Normal heart sounds.   Pulmonary:      Effort: Pulmonary effort is normal.      Breath sounds: Normal breath sounds. "   Abdominal:      General: Abdomen is flat. Bowel sounds are normal.      Palpations: Abdomen is soft.   Musculoskeletal:         General: Normal range of motion.      Cervical back: Normal range of motion and neck supple.   Skin:     General: Skin is warm and dry.      Capillary Refill: Capillary refill takes less than 2 seconds.   Neurological:      General: No focal deficit present.      Mental Status: She is alert and oriented to person, place, and time. Mental status is at baseline.   Psychiatric:         Mood and Affect: Mood normal.         Behavior: Behavior normal.         Thought Content: Thought content normal.         Judgment: Judgment normal.         Results for orders placed or performed in visit on 07/22/22   H. pylori antigen, stool   Result Value Ref Range    H. pylori Antigen Source stool     H. Pylori Antigen, Stool NOT DETECTED NOT DETECTED      Assessment:       1. Wellness examination    2. Benign essential tremor        Plan:       Wellness examination  -     CBC Auto Differential; Future; Expected date: 08/09/2023  -     Comprehensive Metabolic Panel; Future; Expected date: 08/09/2023  -     Lipid Panel; Future; Expected date: 08/09/2023  -     TSH; Future; Expected date: 08/09/2023    Benign essential tremor          Medication List with Changes/Refills   Current Medications    DESOG-E.ESTRADIOL/E.ESTRADIOL (KARIVA) 0.15-0.02 MGX21 /0.01 MG X 5 PER TABLET    Take 1 tablet by mouth once daily.    OMEPRAZOLE (PRILOSEC) 20 MG CAPSULE    Take 1 capsule (20 mg total) by mouth once daily.    ONDANSETRON (ZOFRAN-ODT) 4 MG TBDL    DISSOLVE 1 TABLET(4 MG) ON THE TONGUE EVERY 12 HOURS AS NEEDED FOR NAUSEA

## 2023-08-09 NOTE — PATIENT INSTRUCTIONS
Ask your pharmacist about the Tdap tetanus booster, completion of the COVID-19 vaccine series, hepatitis B vaccine

## 2023-08-18 ENCOUNTER — PATIENT MESSAGE (OUTPATIENT)
Dept: RADIOLOGY | Facility: HOSPITAL | Age: 23
End: 2023-08-18

## 2023-09-01 ENCOUNTER — HOSPITAL ENCOUNTER (OUTPATIENT)
Dept: RADIOLOGY | Facility: HOSPITAL | Age: 23
Discharge: HOME | End: 2023-09-01
Payer: COMMERCIAL

## 2023-09-01 DIAGNOSIS — R11.0 NAUSEA: ICD-10-CM

## 2023-09-01 PROCEDURE — 78264 GASTRIC EMPTYING IMG STUDY: CPT | Mod: 26,,, | Performed by: RADIOLOGY

## 2023-09-01 PROCEDURE — 78264 GASTRIC EMPTYING IMG STUDY: CPT | Mod: TC

## 2023-09-01 PROCEDURE — 78264 NM GASTRIC EMPTYING: ICD-10-PCS | Mod: 26,,, | Performed by: RADIOLOGY

## 2023-09-02 ENCOUNTER — HOSPITAL ENCOUNTER (OUTPATIENT)
Dept: RADIOLOGY | Facility: HOSPITAL | Age: 23
Discharge: HOME | End: 2023-09-02
Payer: COMMERCIAL

## 2023-09-02 DIAGNOSIS — R11.0 NAUSEA: ICD-10-CM

## 2023-09-02 PROCEDURE — 76705 ECHO EXAM OF ABDOMEN: CPT | Mod: TC

## 2023-09-02 PROCEDURE — 76705 US ABDOMEN LIMITED: ICD-10-PCS | Mod: 26,,, | Performed by: RADIOLOGY

## 2023-09-02 PROCEDURE — 76705 ECHO EXAM OF ABDOMEN: CPT | Mod: 26,,, | Performed by: RADIOLOGY

## 2023-09-05 ENCOUNTER — PATIENT MESSAGE (OUTPATIENT)
Dept: GASTROENTEROLOGY | Facility: CLINIC | Age: 23
End: 2023-09-05
Payer: COMMERCIAL

## 2023-09-06 ENCOUNTER — PATIENT MESSAGE (OUTPATIENT)
Dept: GASTROENTEROLOGY | Facility: CLINIC | Age: 23
End: 2023-09-06
Payer: COMMERCIAL

## 2023-09-12 ENCOUNTER — OFFICE VISIT (OUTPATIENT)
Dept: GASTROENTEROLOGY | Facility: CLINIC | Age: 23
End: 2023-09-12
Payer: COMMERCIAL

## 2023-09-12 ENCOUNTER — LAB VISIT (OUTPATIENT)
Dept: LAB | Facility: HOSPITAL | Age: 23
End: 2023-09-12
Payer: COMMERCIAL

## 2023-09-12 ENCOUNTER — PATIENT MESSAGE (OUTPATIENT)
Dept: GASTROENTEROLOGY | Facility: CLINIC | Age: 23
End: 2023-09-12

## 2023-09-12 VITALS
SYSTOLIC BLOOD PRESSURE: 116 MMHG | DIASTOLIC BLOOD PRESSURE: 66 MMHG | BODY MASS INDEX: 21.25 KG/M2 | HEART RATE: 66 BPM | WEIGHT: 132.25 LBS | HEIGHT: 66 IN

## 2023-09-12 DIAGNOSIS — R20.2 NUMBNESS AND TINGLING IN BOTH HANDS: ICD-10-CM

## 2023-09-12 DIAGNOSIS — N92.1 MENORRHAGIA WITH IRREGULAR CYCLE: ICD-10-CM

## 2023-09-12 DIAGNOSIS — Z86.2 HISTORY OF IRON DEFICIENCY ANEMIA: ICD-10-CM

## 2023-09-12 DIAGNOSIS — R42 LIGHTHEADEDNESS: ICD-10-CM

## 2023-09-12 DIAGNOSIS — D64.9 ANEMIA, UNSPECIFIED TYPE: Primary | ICD-10-CM

## 2023-09-12 DIAGNOSIS — Z86.19 HISTORY OF HELICOBACTER PYLORI INFECTION: ICD-10-CM

## 2023-09-12 DIAGNOSIS — K44.9 HIATAL HERNIA: ICD-10-CM

## 2023-09-12 DIAGNOSIS — R10.9 ABDOMINAL CRAMPING: ICD-10-CM

## 2023-09-12 DIAGNOSIS — R53.83 FATIGUE, UNSPECIFIED TYPE: ICD-10-CM

## 2023-09-12 DIAGNOSIS — D64.9 ANEMIA, UNSPECIFIED TYPE: ICD-10-CM

## 2023-09-12 DIAGNOSIS — K22.2 SCHATZKI'S RING: ICD-10-CM

## 2023-09-12 DIAGNOSIS — R20.0 NUMBNESS AND TINGLING IN BOTH HANDS: ICD-10-CM

## 2023-09-12 LAB
FERRITIN SERPL-MCNC: 5 NG/ML (ref 20–300)
IRON SERPL-MCNC: 60 UG/DL (ref 30–160)
SATURATED IRON: 22 % (ref 20–50)
TOTAL IRON BINDING CAPACITY: 279 UG/DL (ref 250–450)
TRANSFERRIN SERPL-MCNC: 199 MG/DL (ref 200–375)

## 2023-09-12 PROCEDURE — 1160F PR REVIEW ALL MEDS BY PRESCRIBER/CLIN PHARMACIST DOCUMENTED: ICD-10-PCS | Mod: CPTII,S$GLB,,

## 2023-09-12 PROCEDURE — 84466 ASSAY OF TRANSFERRIN: CPT

## 2023-09-12 PROCEDURE — 3078F PR MOST RECENT DIASTOLIC BLOOD PRESSURE < 80 MM HG: ICD-10-PCS | Mod: CPTII,S$GLB,,

## 2023-09-12 PROCEDURE — 3078F DIAST BP <80 MM HG: CPT | Mod: CPTII,S$GLB,,

## 2023-09-12 PROCEDURE — 1159F MED LIST DOCD IN RCRD: CPT | Mod: CPTII,S$GLB,,

## 2023-09-12 PROCEDURE — 36415 COLL VENOUS BLD VENIPUNCTURE: CPT

## 2023-09-12 PROCEDURE — 3074F PR MOST RECENT SYSTOLIC BLOOD PRESSURE < 130 MM HG: ICD-10-PCS | Mod: CPTII,S$GLB,,

## 2023-09-12 PROCEDURE — 3074F SYST BP LT 130 MM HG: CPT | Mod: CPTII,S$GLB,,

## 2023-09-12 PROCEDURE — 99999 PR PBB SHADOW E&M-EST. PATIENT-LVL IV: ICD-10-PCS | Mod: PBBFAC,,,

## 2023-09-12 PROCEDURE — 99214 OFFICE O/P EST MOD 30 MIN: CPT | Mod: S$GLB,,,

## 2023-09-12 PROCEDURE — 99214 PR OFFICE/OUTPT VISIT, EST, LEVL IV, 30-39 MIN: ICD-10-PCS | Mod: S$GLB,,,

## 2023-09-12 PROCEDURE — 82728 ASSAY OF FERRITIN: CPT

## 2023-09-12 PROCEDURE — 83540 ASSAY OF IRON: CPT

## 2023-09-12 PROCEDURE — 3008F BODY MASS INDEX DOCD: CPT | Mod: CPTII,S$GLB,,

## 2023-09-12 PROCEDURE — 1160F RVW MEDS BY RX/DR IN RCRD: CPT | Mod: CPTII,S$GLB,,

## 2023-09-12 PROCEDURE — 99999 PR PBB SHADOW E&M-EST. PATIENT-LVL IV: CPT | Mod: PBBFAC,,,

## 2023-09-12 PROCEDURE — 3008F PR BODY MASS INDEX (BMI) DOCUMENTED: ICD-10-PCS | Mod: CPTII,S$GLB,,

## 2023-09-12 PROCEDURE — 1159F PR MEDICATION LIST DOCUMENTED IN MEDICAL RECORD: ICD-10-PCS | Mod: CPTII,S$GLB,,

## 2023-09-12 NOTE — PROGRESS NOTES
Subjective:       Patient ID: Devan Selby is a 23 y.o. female Body mass index is 21.35 kg/m².    Chief Complaint: Anemia    Established patient of Dr. Paredes and myself.     Anemia  Presents for initial visit. Symptoms include abdominal pain (denies pain currently; reports intermittent periumbilical and lower abdominal cramps/pressure that is worse at night & with palpation, but is also spontaneous), light-headedness (Occurs when working out, standing in the heat, waiting to eat, or not eating enough), malaise/fatigue and pica (craving for ice). There has been no bruising/bleeding easily, confusion, fever, leg swelling, pallor, palpitations or weight loss. Signs of blood loss that are present include vaginal bleeding (reports cycles are every 3 months when she takes breaks from OCP; when cycles occur they last 5-6 days and are heavy; last break she took from OCP was 2 months ago). Signs of blood loss that are not present include hematemesis, hematochezia and melena. Past treatments include oral iron supplements (past treatments: oral iron supplement). Past medical history includes neuropathy (intermittent numbness & tingling in both hands; improved slightly after stopping omeprazole). There is no history of alcohol abuse, cancer, chronic liver disease, chronic renal disease, clotting disorder, dementia, heart failure, HIV/AIDS, hypothyroidism, inflammatory bowel disease, malabsorption, malnutrition, recent illness, recent surgery, recent trauma or rheumatic disease. Procedure history includes EGD (02/18/22 - Normal esophagus. Z-line regular, 40 cm from the incisors. Mild Schatzki ring. Dilated. Medium-sized hiatal hernia. Erythematous mucosa in the antrum. Biopsied. Normal examined duodenum; patho: benign, Helicobacter organisms are identified). There is no past history of bone marrow exam, colonoscopy or FOBT.     Review of Systems   Constitutional:  Positive for malaise/fatigue. Negative for activity  change, appetite change, chills, diaphoresis, fatigue, fever, unexpected weight change and weight loss.   HENT:  Negative for sore throat and trouble swallowing.    Respiratory:  Negative for cough, choking and shortness of breath.    Cardiovascular:  Negative for chest pain and palpitations.   Gastrointestinal:  Positive for abdominal pain (denies pain currently; reports intermittent periumbilical and lower abdominal cramps/pressure that is worse at night & with palpation, but is also spontaneous). Negative for abdominal distention, anal bleeding, blood in stool, constipation, diarrhea, hematemesis, hematochezia, melena, nausea, rectal pain and vomiting.   Genitourinary:  Positive for vaginal bleeding (reports cycles are every 3 months when she takes breaks from OCP; when cycles occur they last 5-6 days and are heavy; last break she took from OCP was 2 months ago). Negative for hematuria.   Skin:  Negative for pallor and rash.   Neurological:  Positive for light-headedness (Occurs when working out, standing in the heat, waiting to eat, or not eating enough).   Hematological:  Does not bruise/bleed easily.   Psychiatric/Behavioral:  Negative for confusion.        No LMP recorded.  No past medical history on file.  Past Surgical History:   Procedure Laterality Date    ESOPHAGOGASTRODUODENOSCOPY N/A 2/18/2022    Procedure: EGD (ESOPHAGOGASTRODUODENOSCOPY);  Surgeon: Coleman Paredes MD;  Location: [facility];  Service: Endoscopy;  Laterality: N/A;    UPPER GASTROINTESTINAL ENDOSCOPY       Family History   Problem Relation Age of Onset    Colon cancer Neg Hx     Colon polyps Neg Hx     Crohn's disease Neg Hx     Ulcerative colitis Neg Hx     Stomach cancer Neg Hx     Rectal cancer Neg Hx      Social History     Tobacco Use    Smoking status: Never    Smokeless tobacco: Never   Substance Use Topics    Alcohol use: Yes     Comment: occ    Drug use: No     Wt Readings from Last 10 Encounters:   09/12/23 60 kg (132 lb 4.4  oz)   08/09/23 59.4 kg (130 lb 15.3 oz)   08/07/23 58.3 kg (128 lb 8.5 oz)   07/26/22 53.5 kg (117 lb 15.1 oz)   06/06/22 53.8 kg (118 lb 9.7 oz)   02/18/22 54.4 kg (120 lb)   02/14/22 54 kg (119 lb 0.8 oz)   01/28/22 54.4 kg (119 lb 14.9 oz)   04/09/19 54.4 kg (120 lb) (43 %, Z= -0.18)*   02/25/19 54.4 kg (120 lb) (43 %, Z= -0.17)*     * Growth percentiles are based on CDC (Girls, 2-20 Years) data.     Lab Results   Component Value Date    WBC 5.12 09/02/2023    HGB 10.6 (L) 09/02/2023    HCT 34.9 (L) 09/02/2023    MCV 85 09/02/2023     09/02/2023     CMP  Sodium   Date Value Ref Range Status   09/02/2023 138 136 - 145 mmol/L Final     Potassium   Date Value Ref Range Status   09/02/2023 4.1 3.5 - 5.1 mmol/L Final     Chloride   Date Value Ref Range Status   09/02/2023 107 95 - 110 mmol/L Final     CO2   Date Value Ref Range Status   09/02/2023 21 (L) 23 - 29 mmol/L Final     Glucose   Date Value Ref Range Status   09/02/2023 75 70 - 110 mg/dL Final     BUN   Date Value Ref Range Status   09/02/2023 13 6 - 20 mg/dL Final     Creatinine   Date Value Ref Range Status   09/02/2023 0.9 0.5 - 1.4 mg/dL Final     Calcium   Date Value Ref Range Status   09/02/2023 8.9 8.7 - 10.5 mg/dL Final     Total Protein   Date Value Ref Range Status   09/02/2023 7.0 6.0 - 8.4 g/dL Final     Albumin   Date Value Ref Range Status   09/02/2023 3.5 3.5 - 5.2 g/dL Final     Total Bilirubin   Date Value Ref Range Status   09/02/2023 0.4 0.1 - 1.0 mg/dL Final     Comment:     For infants and newborns, interpretation of results should be based  on gestational age, weight and in agreement with clinical  observations.    Premature Infant recommended reference ranges:  Up to 24 hours.............<8.0 mg/dL  Up to 48 hours............<12.0 mg/dL  3-5 days..................<15.0 mg/dL  6-29 days.................<15.0 mg/dL       Alkaline Phosphatase   Date Value Ref Range Status   09/02/2023 36 (L) 55 - 135 U/L Final     AST   Date Value  Ref Range Status   09/02/2023 21 10 - 40 U/L Final     ALT   Date Value Ref Range Status   09/02/2023 17 10 - 44 U/L Final     Anion Gap   Date Value Ref Range Status   09/02/2023 10 8 - 16 mmol/L Final     eGFR if    Date Value Ref Range Status   01/28/2022 >60.0 >60 mL/min/1.73 m^2 Final     eGFR if non    Date Value Ref Range Status   01/28/2022 >60.0 >60 mL/min/1.73 m^2 Final     Comment:     Calculation used to obtain the estimated glomerular filtration  rate (eGFR) is the CKD-EPI equation.        Lab Results   Component Value Date    AMYLASE 142 (H) 01/28/2022     Lab Results   Component Value Date    LIPASE 29 01/28/2022     Lab Results   Component Value Date    TSH 0.699 09/02/2023     Lab Results   Component Value Date    FERRITIN 5 (L) 09/12/2023     Reviewed prior medical records including radiology report of abdominal ultrasound 09/02/2023, gastric emptying study 09/01/2023, CT abdomen and pelvis 02/14/2022 & endoscopy history (see surgical history).    Objective:      Physical Exam  Vitals and nursing note reviewed.   Constitutional:       General: She is not in acute distress.     Appearance: Normal appearance. She is normal weight. She is not ill-appearing.   HENT:      Mouth/Throat:      Lips: Pink. No lesions.   Cardiovascular:      Rate and Rhythm: Normal rate.      Pulses: Normal pulses.      Heart sounds: Normal heart sounds.   Pulmonary:      Effort: Pulmonary effort is normal. No respiratory distress.      Breath sounds: Normal breath sounds.   Abdominal:      General: Abdomen is flat. Bowel sounds are normal. There is no distension or abdominal bruit. There are no signs of injury.      Palpations: Abdomen is soft. There is no shifting dullness, fluid wave, hepatomegaly, splenomegaly or mass.      Tenderness: There is abdominal tenderness (mild) in the suprapubic area. There is no guarding or rebound. Negative signs include No's sign, Rovsing's sign and  McBurney's sign.   Skin:     General: Skin is warm and dry.      Coloration: Skin is not jaundiced or pale.   Neurological:      Mental Status: She is alert and oriented to person, place, and time.   Psychiatric:         Attention and Perception: Attention normal.         Mood and Affect: Mood normal.         Speech: Speech normal.         Behavior: Behavior normal.         Assessment:       1. Anemia, unspecified type    2. History of iron deficiency anemia    3. Fatigue, unspecified type    4. Lightheadedness    5. Menorrhagia with irregular cycle    6. History of Schatzki's ring    7. Hiatal hernia    8. History of Helicobacter pylori infection    9. Abdominal cramping    10. Numbness and tingling in both hands        Plan:       Anemia, unspecified type, History of iron deficiency anemia, Fatigue, unspecified type, & Lightheadedness   - schedule EGD, discussed procedure with patient, including risks and benefits, patient verbalized understanding  - schedule Colonoscopy, discussed procedure with the patient, including risks and benefits, patient verbalized understanding  - discussed with patient the different ways that anemia occurs: blood loss (such as from the gi tract), the body is not making enough, or the body is breaking down the rbcs too quickly; recommend EGD and colonoscopy to further evaluate gi tract for possible blood loss and pending results of endoscopies, possible UGI with Small Bowel Follow Through/video capsule study  -follow-up with PCP and/or hematology for continued evaluation and management  -     IRON AND TIBC; Future; Expected date: 09/12/2023  -     Ferritin; Future; Expected date: 09/12/2023    Menorrhagia with irregular cycle  - follow-up with GYN for continued evaluation and management     History of Schatzki's ring  - follow-up with p.r.n. if dysphagia occurs     Hiatal hernia  - usually performed if reflux is uncontrolled by medication management and lifestyle/dietary modifications;  if symptoms persist despite medication management and lifestyle/dietary modifications, we can refer to general surgery to consult about surgical options    History of Helicobacter pylori infection  - resolved; h. Pylori stool test negative 07/22/22    Abdominal cramping  - schedule Colonoscopy, discussed procedure with the patient, including risks and benefits, patient verbalized understanding  -consider bentyl    Numbness and tingling in both hands  -     Ambulatory referral/consult to Neurology; Future; Expected date: 09/19/2023    Follow up in about 4 weeks (around 10/10/2023), or if symptoms worsen or fail to improve.      If no improvement in symptoms or symptoms worsen, call/follow-up at clinic or go to ER.        34 minutes of total time spent on the encounter, which includes face to face time and non-face to face time preparing to see the patient (eg, review of tests), Obtaining and/or reviewing separately obtained history, Documenting clinical information in the electronic or other health record, Independently interpreting results (not separately reported) and communicating results to the patient/family/caregiver, or Care coordination (not separately reported).     A dictation software program was used for this note. Please expect some simple typographical  errors in this note.     No complaints

## 2023-09-25 ENCOUNTER — PATIENT MESSAGE (OUTPATIENT)
Dept: DERMATOLOGY | Facility: CLINIC | Age: 23
End: 2023-09-25
Payer: COMMERCIAL

## 2023-10-03 ENCOUNTER — TELEPHONE (OUTPATIENT)
Dept: NEUROLOGY | Facility: CLINIC | Age: 23
End: 2023-10-03
Payer: COMMERCIAL

## 2023-10-12 ENCOUNTER — PATIENT MESSAGE (OUTPATIENT)
Dept: GASTROENTEROLOGY | Facility: CLINIC | Age: 23
End: 2023-10-12
Payer: COMMERCIAL

## 2023-10-12 DIAGNOSIS — R10.30 LOWER ABDOMINAL PAIN: Primary | ICD-10-CM

## 2023-10-16 ENCOUNTER — TELEPHONE (OUTPATIENT)
Dept: NEUROLOGY | Facility: CLINIC | Age: 23
End: 2023-10-16
Payer: COMMERCIAL

## 2023-10-17 ENCOUNTER — ANESTHESIA (OUTPATIENT)
Dept: ENDOSCOPY | Facility: HOSPITAL | Age: 23
End: 2023-10-17
Payer: COMMERCIAL

## 2023-10-17 ENCOUNTER — OFFICE VISIT (OUTPATIENT)
Dept: NEUROLOGY | Facility: CLINIC | Age: 23
End: 2023-10-17
Payer: COMMERCIAL

## 2023-10-17 ENCOUNTER — HOSPITAL ENCOUNTER (OUTPATIENT)
Facility: HOSPITAL | Age: 23
Discharge: HOME | End: 2023-10-17
Attending: INTERNAL MEDICINE | Admitting: FAMILY MEDICINE
Payer: COMMERCIAL

## 2023-10-17 ENCOUNTER — TELEPHONE (OUTPATIENT)
Dept: NEUROLOGY | Facility: CLINIC | Age: 23
End: 2023-10-17

## 2023-10-17 ENCOUNTER — ANESTHESIA EVENT (OUTPATIENT)
Dept: ENDOSCOPY | Facility: HOSPITAL | Age: 23
End: 2023-10-17
Payer: COMMERCIAL

## 2023-10-17 VITALS
HEIGHT: 66 IN | WEIGHT: 131.19 LBS | BODY MASS INDEX: 21.08 KG/M2 | DIASTOLIC BLOOD PRESSURE: 82 MMHG | SYSTOLIC BLOOD PRESSURE: 121 MMHG | HEART RATE: 60 BPM

## 2023-10-17 VITALS
TEMPERATURE: 98 F | SYSTOLIC BLOOD PRESSURE: 118 MMHG | WEIGHT: 131 LBS | HEART RATE: 62 BPM | DIASTOLIC BLOOD PRESSURE: 77 MMHG | BODY MASS INDEX: 21.14 KG/M2 | RESPIRATION RATE: 14 BRPM | OXYGEN SATURATION: 100 %

## 2023-10-17 DIAGNOSIS — D50.9 IRON DEFICIENCY ANEMIA, UNSPECIFIED IRON DEFICIENCY ANEMIA TYPE: ICD-10-CM

## 2023-10-17 DIAGNOSIS — K64.8 INTERNAL HEMORRHOIDS: ICD-10-CM

## 2023-10-17 DIAGNOSIS — R20.0 NUMBNESS AND TINGLING IN BOTH HANDS: ICD-10-CM

## 2023-10-17 DIAGNOSIS — K29.70 GASTRITIS, PRESENCE OF BLEEDING UNSPECIFIED, UNSPECIFIED CHRONICITY, UNSPECIFIED GASTRITIS TYPE: Primary | ICD-10-CM

## 2023-10-17 DIAGNOSIS — K44.9 HIATAL HERNIA: ICD-10-CM

## 2023-10-17 DIAGNOSIS — R25.1 TREMOR: Primary | ICD-10-CM

## 2023-10-17 DIAGNOSIS — R20.2 NUMBNESS AND TINGLING IN BOTH HANDS: ICD-10-CM

## 2023-10-17 DIAGNOSIS — D64.9 ANEMIA, UNSPECIFIED: ICD-10-CM

## 2023-10-17 LAB
B-HCG UR QL: NEGATIVE
CTP QC/QA: YES

## 2023-10-17 PROCEDURE — 37000009 HC ANESTHESIA EA ADD 15 MINS: Performed by: INTERNAL MEDICINE

## 2023-10-17 PROCEDURE — 43239 EGD BIOPSY SINGLE/MULTIPLE: CPT | Performed by: INTERNAL MEDICINE

## 2023-10-17 PROCEDURE — D9220A PRA ANESTHESIA: Mod: CRNA,,, | Performed by: STUDENT IN AN ORGANIZED HEALTH CARE EDUCATION/TRAINING PROGRAM

## 2023-10-17 PROCEDURE — 3074F SYST BP LT 130 MM HG: CPT | Mod: CPTII,S$GLB,, | Performed by: NURSE PRACTITIONER

## 2023-10-17 PROCEDURE — 99999 PR PBB SHADOW E&M-EST. PATIENT-LVL III: ICD-10-PCS | Mod: PBBFAC,,, | Performed by: NURSE PRACTITIONER

## 2023-10-17 PROCEDURE — 3008F BODY MASS INDEX DOCD: CPT | Mod: CPTII,S$GLB,, | Performed by: NURSE PRACTITIONER

## 2023-10-17 PROCEDURE — 99999 PR PBB SHADOW E&M-EST. PATIENT-LVL III: CPT | Mod: PBBFAC,,, | Performed by: NURSE PRACTITIONER

## 2023-10-17 PROCEDURE — 99204 OFFICE O/P NEW MOD 45 MIN: CPT | Mod: S$GLB,,, | Performed by: NURSE PRACTITIONER

## 2023-10-17 PROCEDURE — 3074F PR MOST RECENT SYSTOLIC BLOOD PRESSURE < 130 MM HG: ICD-10-PCS | Mod: CPTII,S$GLB,, | Performed by: NURSE PRACTITIONER

## 2023-10-17 PROCEDURE — D9220A PRA ANESTHESIA: ICD-10-PCS | Mod: CRNA,,, | Performed by: STUDENT IN AN ORGANIZED HEALTH CARE EDUCATION/TRAINING PROGRAM

## 2023-10-17 PROCEDURE — 63600175 PHARM REV CODE 636 W HCPCS: Performed by: STUDENT IN AN ORGANIZED HEALTH CARE EDUCATION/TRAINING PROGRAM

## 2023-10-17 PROCEDURE — 25000003 PHARM REV CODE 250: Performed by: INTERNAL MEDICINE

## 2023-10-17 PROCEDURE — 37000008 HC ANESTHESIA 1ST 15 MINUTES: Performed by: INTERNAL MEDICINE

## 2023-10-17 PROCEDURE — 43239 EGD BIOPSY SINGLE/MULTIPLE: CPT | Mod: 51,,, | Performed by: INTERNAL MEDICINE

## 2023-10-17 PROCEDURE — 3079F DIAST BP 80-89 MM HG: CPT | Mod: CPTII,S$GLB,, | Performed by: NURSE PRACTITIONER

## 2023-10-17 PROCEDURE — 27201012 HC FORCEPS, HOT/COLD, DISP: Performed by: INTERNAL MEDICINE

## 2023-10-17 PROCEDURE — 43239 PR EGD, FLEX, W/BIOPSY, SGL/MULTI: ICD-10-PCS | Mod: 51,,, | Performed by: INTERNAL MEDICINE

## 2023-10-17 PROCEDURE — D9220A PRA ANESTHESIA: ICD-10-PCS | Mod: ANES,,, | Performed by: ANESTHESIOLOGY

## 2023-10-17 PROCEDURE — 45378 DIAGNOSTIC COLONOSCOPY: CPT | Mod: ,,, | Performed by: INTERNAL MEDICINE

## 2023-10-17 PROCEDURE — 45378 DIAGNOSTIC COLONOSCOPY: CPT | Performed by: INTERNAL MEDICINE

## 2023-10-17 PROCEDURE — 81025 URINE PREGNANCY TEST: CPT | Performed by: INTERNAL MEDICINE

## 2023-10-17 PROCEDURE — 99204 PR OFFICE/OUTPT VISIT, NEW, LEVL IV, 45-59 MIN: ICD-10-PCS | Mod: S$GLB,,, | Performed by: NURSE PRACTITIONER

## 2023-10-17 PROCEDURE — 3079F PR MOST RECENT DIASTOLIC BLOOD PRESSURE 80-89 MM HG: ICD-10-PCS | Mod: CPTII,S$GLB,, | Performed by: NURSE PRACTITIONER

## 2023-10-17 PROCEDURE — 45378 PR COLONOSCOPY,DIAGNOSTIC: ICD-10-PCS | Mod: ,,, | Performed by: INTERNAL MEDICINE

## 2023-10-17 PROCEDURE — 3008F PR BODY MASS INDEX (BMI) DOCUMENTED: ICD-10-PCS | Mod: CPTII,S$GLB,, | Performed by: NURSE PRACTITIONER

## 2023-10-17 PROCEDURE — 25000003 PHARM REV CODE 250: Performed by: STUDENT IN AN ORGANIZED HEALTH CARE EDUCATION/TRAINING PROGRAM

## 2023-10-17 PROCEDURE — D9220A PRA ANESTHESIA: Mod: ANES,,, | Performed by: ANESTHESIOLOGY

## 2023-10-17 PROCEDURE — 63600175 PHARM REV CODE 636 W HCPCS: Performed by: ANESTHESIOLOGY

## 2023-10-17 RX ORDER — NORETHINDRONE ACETATE AND ETHINYL ESTRADIOL, AND FERROUS FUMARATE 1.5-30(21)
1 KIT ORAL
COMMUNITY
Start: 2023-10-09

## 2023-10-17 RX ORDER — ONDANSETRON 2 MG/ML
4 INJECTION INTRAMUSCULAR; INTRAVENOUS ONCE
Status: COMPLETED | OUTPATIENT
Start: 2023-10-17 | End: 2023-10-17

## 2023-10-17 RX ORDER — LIDOCAINE HYDROCHLORIDE 20 MG/ML
INJECTION INTRAVENOUS
Status: DISCONTINUED | OUTPATIENT
Start: 2023-10-17 | End: 2023-10-17

## 2023-10-17 RX ORDER — PROPOFOL 10 MG/ML
VIAL (ML) INTRAVENOUS
Status: DISCONTINUED | OUTPATIENT
Start: 2023-10-17 | End: 2023-10-17

## 2023-10-17 RX ORDER — SODIUM CHLORIDE 9 MG/ML
INJECTION, SOLUTION INTRAVENOUS CONTINUOUS
Status: DISCONTINUED | OUTPATIENT
Start: 2023-10-17 | End: 2023-10-17 | Stop reason: HOSPADM

## 2023-10-17 RX ADMIN — PROPOFOL 30 MG: 10 INJECTION, EMULSION INTRAVENOUS at 11:10

## 2023-10-17 RX ADMIN — LIDOCAINE HYDROCHLORIDE 100 MG: 20 INJECTION, SOLUTION INTRAVENOUS at 11:10

## 2023-10-17 RX ADMIN — ONDANSETRON 4 MG: 2 INJECTION INTRAMUSCULAR; INTRAVENOUS at 12:10

## 2023-10-17 RX ADMIN — PROPOFOL 100 MG: 10 INJECTION, EMULSION INTRAVENOUS at 11:10

## 2023-10-17 RX ADMIN — PROPOFOL 50 MG: 10 INJECTION, EMULSION INTRAVENOUS at 11:10

## 2023-10-17 RX ADMIN — PROPOFOL 20 MG: 10 INJECTION, EMULSION INTRAVENOUS at 11:10

## 2023-10-17 RX ADMIN — SODIUM CHLORIDE: 0.9 INJECTION, SOLUTION INTRAVENOUS at 11:10

## 2023-10-17 NOTE — TRANSFER OF CARE
Anesthesia Transfer of Care Note    Patient: Devan Selby    Procedure(s) Performed: Procedure(s) (LRB):  EGD (ESOPHAGOGASTRODUODENOSCOPY) (N/A)  COLONOSCOPY (N/A)    Patient location: GI    Anesthesia Type: general    Transport from OR: Transported from OR on room air with adequate spontaneous ventilation    Post pain: adequate analgesia    Post assessment: no apparent anesthetic complications and tolerated procedure well    Post vital signs: stable    Level of consciousness: awake    Nausea/Vomiting: no nausea/vomiting    Complications: none    Transfer of care protocol was followed      Last vitals:   Visit Vitals  /80 (BP Location: Left arm, Patient Position: Lying)   Pulse 62   Temp 36.6 °C (97.9 °F) (Skin)   Resp 18   Wt 59.4 kg (131 lb)   SpO2 100%   Breastfeeding No   BMI 21.14 kg/m²

## 2023-10-17 NOTE — ASSESSMENT & PLAN NOTE
Only tongue tremor noted on exam today -- otherwise, has normal neurological exam   As noted, reviewed pt's recordings of described tremor in the RUE -- appears to be postural / action tremor  She noted onset after starting omeprazole. There are post marketing reports of this medication causing tremor. In the case of drug induced tremor, could take 6-9 months to resolve.   Given other reported head movements and age, will obtain MRI brain and further serologies for evaluation.

## 2023-10-17 NOTE — PROGRESS NOTES
NEUROLOGY  Outpatient Consultation Visit     [facility]  [address], [place] [address]  [phone] (office) / [phone] (fax)    Patient Name:  Devan Selby  :  2000  MR #:  [id number]  Acct #:  [id number]    Date of  Visit: 10/17/2023    Other Physicians:  Ha Baires MD (Primary Care Physician)      CHIEF COMPLAINT: Tremors (Hands and head)        HISTORY OF PRESENT ILLNESS:  Devan Selby is a 23 y.o. R-handed female seen in consultation for tremor per Coco Petersen NP    Medical history is significant for Fe def anemia, H pylori infection    Noted tremor in the R hand approx 9-10 months ago. Intermittent. No clear trigger, such as actions or postures. No relieving factors. Onset was shortly after starting PPI, so stopped taking it but tremor unchanged. Tremor has occurred at rest and with action. Has video of her laying in bed with the RUE outstretched with high freq low amp tremor. Has another video of her operating computer mouse and the R thumb tremoring. When she has the tremor, she notices paresthesias in her fingertips that only last for a matter of seconds.     Also describes a head tic. This occurs as a separate phenomenon from the hand tremor. Describes that her head will subtly jerk to the left randomly. Not an exaggerated movement. Also, has noted a pulling sensation in the lower lip to the R side (not associated with the head jerk though). She feels like she has a lot of tension in her neck.     Denies vocal tremor or facial tremor. Reports that she has had a RLE tremor intermittently since childhood. This is mostly when seated with active plantar flexion of the foot. She can make it stop by resting or moving her leg.      No gait change or other involuntary movements elsewhere.      Not aware of any FH of tremor or movement disorder.     Seeing GI for anemia evaluation. Recent labs showed Fe deficiency. She hasn't started a  "supplement yet. She is having EGD and colonoscopy today.     Recently graduated from [employer]. Working as medical assistant at [employer]. Lives in [place] with parents, has well water supply.     Denies routine ETOH or drug use. Non smoker.     Allergies:  Review of patient's allergies indicates:  No Known Allergies    Current Medications:  No current facility-administered medications for this visit.     No current outpatient medications on file.     Facility-Administered Medications Ordered in Other Visits   Medication Dose Route Frequency Provider Last Rate Last Admin    0.9%  NaCl infusion   Intravenous Continuous Coleman Paredes MD           Past Medical History:  History reviewed. No pertinent past medical history.    Past Surgical History:  Past Surgical History:   Procedure Laterality Date    ESOPHAGOGASTRODUODENOSCOPY N/A 2/18/2022    Procedure: EGD (ESOPHAGOGASTRODUODENOSCOPY);  Surgeon: Coleman Paredes MD;  Location: [facility];  Service: Endoscopy;  Laterality: N/A;    UPPER GASTROINTESTINAL ENDOSCOPY         Family History:  family history is not on file.    Social History:   reports that she has never smoked. She has never used smokeless tobacco. She reports current alcohol use. She reports that she does not use drugs.      REVIEW OF SYSTEMS:  As per HPI    PHYSICAL EXAM:  /82 (BP Location: Right arm, Patient Position: Sitting, BP Method: Small (Automatic))   Pulse 60   Ht 5' 6" (1.676 m)   Wt 59.5 kg (131 lb 2.8 oz)   BMI 21.17 kg/m²     General: Well groomed. No acute distress.  Pulmonary: Normal effort and rate.   Musculoskeletal: No obvious joint deformities, moves all extremities well.  Extremities: No clubbing, cyanosis or edema.     Neurological exam:  Mental status: Awake and alert.  Oriented to person, place, time and situation. Recent and remote memory appear to be intact.  Fund of knowledge normal. Normal attention and concentration.   Speech/Language: Fluent and appropriate. No " "dysarthria or aphasia on conversation. Able to follow complex commands.   Cranial nerves (II-XII): Visual fields full. Pupils equal round and reactive to light, extraocular movements intact, no ptosis, no nystagmus. Facial sensation and symmetry intact bilaterally. Hearing grossly intact. Palate mobile and midline. Shoulder shrug normal bilaterally. Normal tongue protrusion.   Motor: 5 out of 5 strength throughout the upper and lower extremities bilaterally. Normal bulk and tone. No abnormal movements noted. No drift appreciated.   Sensation: Intact to light touch and vibration bilaterally.   DTR: 3+ at the knees and biceps bilaterally. Neg Lira's, no clonus at the ankle.   Coordination: Finger-nose-finger testing intact bilaterally. CIERRA normal bilaterally. Faint tremor of the tongue with protrusion. Otherwise, no tremor noted during exam.   Gait: Normal gait, including tandem.     DIAGNOSTIC DATA:  I have personally reviewed provider notes, labs and imaging made available to me today.     Imaging:  Na    Cardiac:  Na    Labs:  Lab Results   Component Value Date    WBC 5.12 09/02/2023    HGB 10.6 (L) 09/02/2023    HCT 34.9 (L) 09/02/2023     09/02/2023    MCV 85 09/02/2023    RDW 13.5 09/02/2023     Lab Results   Component Value Date     09/02/2023    K 4.1 09/02/2023     09/02/2023    CO2 21 (L) 09/02/2023    BUN 13 09/02/2023    CREATININE 0.9 09/02/2023    GLU 75 09/02/2023    CALCIUM 8.9 09/02/2023     Lab Results   Component Value Date    PROT 7.0 09/02/2023    ALBUMIN 3.5 09/02/2023    BILITOT 0.4 09/02/2023    AST 21 09/02/2023    ALKPHOS 36 (L) 09/02/2023    ALT 17 09/02/2023     No results found for: "INR", "PROTIME", "PTT"  Lab Results   Component Value Date    CHOL 156 09/02/2023    HDL 65 09/02/2023    LDLCALC 81.0 09/02/2023    TRIG 50 09/02/2023    CHOLHDL 41.7 09/02/2023     No results found for: "LABA1C", "HGBA1C"   No results found for: "RAQEVEPW03"  No results found for: " ""FOLATE"  Lab Results   Component Value Date    TSH 0.699 09/02/2023     Component      Latest Ref Rng 9/12/2023   Iron      30 - 160 ug/dL 60    Transferrin      200 - 375 mg/dL 199 (L)    TIBC      250 - 450 ug/dL 279    Saturated Iron      20 - 50 % 22    Ferritin      20.0 - 300.0 ng/mL 5 (L)       Legend:  (L) Low      ASSESSMENT & PLAN:  Devan Selby is a 23 y.o. R-handed female seen in consultation for tremor.     Problem List Items Addressed This Visit          Neuro    Tremor - Primary    Current Assessment & Plan     Only tongue tremor noted on exam today -- otherwise, has normal neurological exam   As noted, reviewed pt's recordings of described tremor in the RUE -- appears to be postural / action tremor  She noted onset after starting omeprazole. There are post marketing reports of this medication causing tremor. In the case of drug induced tremor, could take 6-9 months to resolve.   Given other reported head movements and age, will obtain MRI brain and further serologies for evaluation.           Other Visit Diagnoses       Numbness and tingling in both hands                Follow up: 4-6 weeks, ok for VV    I spent a total of 45 minutes on the day of the visit.    This includes face to face time with the patient, as well as non-face to face time preparing for and completing the visit (review of prior diagnostic testing and clinical notes, obtaining or reviewing history, documenting clinical information in the EMR, independently interpreting and communicating results to the patient/family and coordinating ongoing care).       I appreciate the opportunity to participate in the care of this patient. Please feel free to contact me with any concerns or questions.       Katie Banks, [facility]  [facility]  [address]  [place] [phone]    "

## 2023-10-17 NOTE — ANESTHESIA PREPROCEDURE EVALUATION
10/17/2023  Devan Selby is a 23 y.o., female.      Pre-op Assessment    I have reviewed the Patient Summary Reports.     I have reviewed the Nursing Notes. I have reviewed the NPO Status.   I have reviewed the Medications.     Review of Systems  Anesthesia Hx:  No problems with previous Anesthesia    Social:  Non-Smoker, Social Alcohol Use    Hematology/Oncology:  Hematology Normal   Oncology Normal     EENT/Dental:EENT/Dental Normal   Cardiovascular:  Cardiovascular Normal     Pulmonary:  Pulmonary Normal    Renal/:  Renal/ Normal     Hepatic/GI:   Bowel Prep.    Musculoskeletal:  Musculoskeletal Normal    Neurological:  Neurology Normal    Endocrine:  Endocrine Normal    Dermatological:  Skin Normal    Psych:  Psychiatric Normal           Physical Exam  General: Alert, Cooperative, Well nourished and Oriented    Airway:  Mallampati: II   Mouth Opening: Normal  TM Distance: Normal  Tongue: Normal    Dental:  Intact    Chest/Lungs:  Normal Respiratory Rate    Heart:  Rate: Normal        Anesthesia Plan  Type of Anesthesia, risks & benefits discussed:    Anesthesia Type: Gen Natural Airway  Intra-op Monitoring Plan: Standard ASA Monitors  Informed Consent: Informed consent signed with the Patient and all parties understand the risks and agree with anesthesia plan.  All questions answered.   ASA Score: 1    Ready For Surgery From Anesthesia Perspective.     .

## 2023-10-17 NOTE — PROVATION PATIENT INSTRUCTIONS
Discharge Summary/Instructions after an Endoscopic Procedure  Patient Name: Devan Selby  Patient MRN: [id number]  Patient YOB: 2000 Tuesday, October 17, 2023  Coleman Escoto MD  Dear patient,  As a result of recent federal legislation (The Federal Cures Act), you may   receive lab or pathology results from your procedure in your [facility]   account before your physician is able to contact you. Your physician or   their representative will relay the results to you with their   recommendations at their soonest availability.  Thank you,  RESTRICTIONS:  During your procedure today, you received medications for sedation.  These   medications may affect your judgment, balance and coordination.  Therefore,   for 24 hours, you have the following restrictions:   - DO NOT drive a car, operate machinery, make legal/financial decisions,   sign important papers or drink alcohol.    ACTIVITY:  Today: no heavy lifting, straining or running due to procedural   sedation/anesthesia.  The following day: return to full activity including work.  DIET:  Eat and drink normally unless instructed otherwise.     TREATMENT FOR COMMON SIDE EFFECTS:  - Mild abdominal pain, nausea, belching, bloating or excessive gas:  rest,   eat lightly and use a heating pad.  - Sore Throat: treat with throat lozenges and/or gargle with warm salt   water.  - Because air was used during the procedure, expelling large amounts of air   from your rectum or belching is normal.  - If a bowel prep was taken, you may not have a bowel movement for 1-3 days.    This is normal.  SYMPTOMS TO WATCH FOR AND REPORT TO YOUR PHYSICIAN:  1. Abdominal pain or bloating, other than gas cramps.  2. Chest pain.  3. Back pain.  4. Signs of infection such as: chills or fever occurring within 24 hours   after the procedure.  5. Rectal bleeding, which would show as bright red, maroon, or black stools.   (A tablespoon of blood from the rectum is not serious, especially  if   hemorrhoids are present.)  6. Vomiting.  7. Weakness or dizziness.  GO DIRECTLY TO THE NEAREST EMERGENCY ROOM IF YOU HAVE ANY OF THE FOLLOWING:      Difficulty breathing              Chills and/or fever over 101 F   Persistent vomiting and/or vomiting blood   Severe abdominal pain   Severe chest pain   Black, tarry stools   Bleeding- more than one tablespoon   Any other symptom or condition that you feel may need urgent attention  Your doctor recommends these additional instructions:  If any biopsies were taken, your doctors clinic will contact you in 1 to 2   weeks with any results.  - Patient has a contact number available for emergencies.  The signs and   symptoms of potential delayed complications were discussed with the   patient.  Return to normal activities tomorrow.  Written discharge   instructions were provided to the patient.   - Resume previous diet.   - Continue present medications.   - No aspirin, ibuprofen, naproxen, or other non-steroidal anti-inflammatory   drugs.   - Await pathology results.   - Discharge patient to home (ambulatory).   - Follow an antireflux regimen.   - Perform a colonoscopy today.   - Return to GI office after studies are complete.  For questions, problems or results please call your physician - Coleman Escoto MD at Work:  [phone].  [facility], EMERGENCY ROOM PHONE NUMBER: [phone]  IF A COMPLICATION OR EMERGENCY SITUATION ARISES AND YOU ARE UNABLE TO REACH   YOUR PHYSICIAN - GO DIRECTLY TO THE EMERGENCY ROOM.  Coleman Escoto MD  10/17/2023 11:11:59 AM  This report has been verified and signed electronically.  Dear patient,  As a result of recent federal legislation (The Federal Cures Act), you may   receive lab or pathology results from your procedure in your [facility]   account before your physician is able to contact you. Your physician or   their representative will relay the results to you with their   recommendations at their soonest  availability.  Thank you,  PROVATION

## 2023-10-17 NOTE — ANESTHESIA POSTPROCEDURE EVALUATION
Anesthesia Post Evaluation    Patient: Devan Selby    Procedure(s) Performed: Procedure(s) (LRB):  EGD (ESOPHAGOGASTRODUODENOSCOPY) (N/A)  COLONOSCOPY (N/A)    Final Anesthesia Type: general      Patient location during evaluation: PACU  Patient participation: Yes- Able to Participate  Level of consciousness: awake and alert  Post-procedure vital signs: reviewed and stable  Pain management: adequate  Airway patency: patent    PONV status at discharge: No PONV  Anesthetic complications: no      Cardiovascular status: hemodynamically stable  Respiratory status: unassisted and room air  Hydration status: euvolemic  Follow-up not needed.          Vitals Value Taken Time   /77 10/17/23 1220   Temp 36.7 °C (98.1 °F) 10/17/23 1220   Pulse 62 10/17/23 1221   Resp 14 10/17/23 1220   SpO2 100 % 10/17/23 1221   Vitals shown include unvalidated device data.      No case tracking events are documented in the log.      Pain/Angela Score: Angela Score: 10 (10/17/2023 12:31 PM)

## 2023-10-17 NOTE — H&P (VIEW-ONLY)
CC: Change in bowel habits, CHEYANNE    23 year old female with above. States that symptoms are stable, no alleviating/exacerbating factors. No bleeding or weight loss.     ROS:  No headache, no fever/chills, no chest pain/SOB, no nausea/vomiting/diarrhea/constipation/GI bleeding/abdominal pain, no dysuria/hematuria.    VSSAF   Exam:   Alert and oriented x 3; no apparent distress   PERRLA, sclera anicteric  CV: Regular rate/rhythm, normal PMI   Lungs: Clear bilaterally with no wheeze/rales   Abdomen: Soft, NT/ND, normal bowel sounds   Ext: No cyanosis, clubbing     Impression:   As above    Plan:   Proceed with endoscopy. Further recs to follow.

## 2023-10-17 NOTE — TELEPHONE ENCOUNTER
----- Message from Katie Banks NP sent at 10/17/2023 10:51 AM CDT -----  Labs, MRI brain  Follow up VV in 6 weeks

## 2023-10-17 NOTE — PROVATION PATIENT INSTRUCTIONS
Discharge Summary/Instructions after an Endoscopic Procedure  Patient Name: Devan Selby  Patient MRN: [id number]  Patient YOB: 2000 Tuesday, October 17, 2023  Coleman Escoto MD  Dear patient,  As a result of recent federal legislation (The Federal Cures Act), you may   receive lab or pathology results from your procedure in your [facility]   account before your physician is able to contact you. Your physician or   their representative will relay the results to you with their   recommendations at their soonest availability.  Thank you,  RESTRICTIONS:  During your procedure today, you received medications for sedation.  These   medications may affect your judgment, balance and coordination.  Therefore,   for 24 hours, you have the following restrictions:   - DO NOT drive a car, operate machinery, make legal/financial decisions,   sign important papers or drink alcohol.    ACTIVITY:  Today: no heavy lifting, straining or running due to procedural   sedation/anesthesia.  The following day: return to full activity including work.  DIET:  Eat and drink normally unless instructed otherwise.     TREATMENT FOR COMMON SIDE EFFECTS:  - Mild abdominal pain, nausea, belching, bloating or excessive gas:  rest,   eat lightly and use a heating pad.  - Sore Throat: treat with throat lozenges and/or gargle with warm salt   water.  - Because air was used during the procedure, expelling large amounts of air   from your rectum or belching is normal.  - If a bowel prep was taken, you may not have a bowel movement for 1-3 days.    This is normal.  SYMPTOMS TO WATCH FOR AND REPORT TO YOUR PHYSICIAN:  1. Abdominal pain or bloating, other than gas cramps.  2. Chest pain.  3. Back pain.  4. Signs of infection such as: chills or fever occurring within 24 hours   after the procedure.  5. Rectal bleeding, which would show as bright red, maroon, or black stools.   (A tablespoon of blood from the rectum is not serious, especially  if   hemorrhoids are present.)  6. Vomiting.  7. Weakness or dizziness.  GO DIRECTLY TO THE NEAREST EMERGENCY ROOM IF YOU HAVE ANY OF THE FOLLOWING:      Difficulty breathing              Chills and/or fever over 101 F   Persistent vomiting and/or vomiting blood   Severe abdominal pain   Severe chest pain   Black, tarry stools   Bleeding- more than one tablespoon   Any other symptom or condition that you feel may need urgent attention  Your doctor recommends these additional instructions:  If any biopsies were taken, your doctors clinic will contact you in 1 to 2   weeks with any results.  - Patient has a contact number available for emergencies.  The signs and   symptoms of potential delayed complications were discussed with the   patient.  Return to normal activities tomorrow.  Written discharge   instructions were provided to the patient.   - High fiber diet.   - Continue present medications.   - Repeat colonoscopy at age 46 for screening purposes.   - Discharge patient to home (ambulatory).   - To visualize the small bowel, perform video capsule endoscopy at   appointment to be scheduled.   - Return to GI office after studies are complete.  For questions, problems or results please call your physician - Coleman Escoto MD at Work:  [phone].  [facility], EMERGENCY ROOM PHONE NUMBER: [phone]  IF A COMPLICATION OR EMERGENCY SITUATION ARISES AND YOU ARE UNABLE TO REACH   YOUR PHYSICIAN - GO DIRECTLY TO THE EMERGENCY ROOM.  Coleman Escoto MD  10/17/2023 11:33:04 AM  This report has been verified and signed electronically.  Dear patient,  As a result of recent federal legislation (The Federal Cures Act), you may   receive lab or pathology results from your procedure in your [facility]   account before your physician is able to contact you. Your physician or   their representative will relay the results to you with their   recommendations at their soonest availability.  Thank you,  PROVATION

## 2023-10-19 ENCOUNTER — LAB VISIT (OUTPATIENT)
Dept: LAB | Facility: HOSPITAL | Age: 23
End: 2023-10-19
Payer: COMMERCIAL

## 2023-10-19 DIAGNOSIS — R25.1 TREMOR: ICD-10-CM

## 2023-10-19 LAB
CERULOPLASMIN SERPL-MCNC: 36 MG/DL (ref 15–45)
CREAT SERPL-MCNC: 0.9 MG/DL (ref 0.5–1.4)
EST. GFR  (NO RACE VARIABLE): >60 ML/MIN/1.73 M^2

## 2023-10-19 PROCEDURE — 86592 SYPHILIS TEST NON-TREP QUAL: CPT | Performed by: NURSE PRACTITIONER

## 2023-10-19 PROCEDURE — 82525 ASSAY OF COPPER: CPT | Performed by: NURSE PRACTITIONER

## 2023-10-19 PROCEDURE — 82300 ASSAY OF CADMIUM: CPT | Performed by: NURSE PRACTITIONER

## 2023-10-19 PROCEDURE — 82390 ASSAY OF CERULOPLASMIN: CPT | Performed by: NURSE PRACTITIONER

## 2023-10-19 PROCEDURE — 83921 ORGANIC ACID SINGLE QUANT: CPT | Performed by: NURSE PRACTITIONER

## 2023-10-19 PROCEDURE — 82565 ASSAY OF CREATININE: CPT | Performed by: NURSE PRACTITIONER

## 2023-10-19 PROCEDURE — 82607 VITAMIN B-12: CPT | Performed by: NURSE PRACTITIONER

## 2023-10-20 LAB
RPR SER QL: NORMAL
VIT B12 SERPL-MCNC: 430 PG/ML (ref 210–950)

## 2023-10-23 LAB
ARSENIC BLD-MCNC: <1 NG/ML
CADMIUM BLD-MCNC: 0.2 NG/ML
CITY: NORMAL
COUNTY: NORMAL
GUARDIAN FIRST NAME: NORMAL
GUARDIAN LAST NAME: NORMAL
HOME PHONE: NORMAL
LEAD BLD-MCNC: <1 MCG/DL
MERCURY BLD-MCNC: <1 NG/ML
RACE: NORMAL
STATE: NORMAL
STREET ADDRESS: NORMAL
VENOUS/CAPILLARY: NORMAL
ZIP: NORMAL

## 2023-10-24 ENCOUNTER — HOSPITAL ENCOUNTER (OUTPATIENT)
Dept: RADIOLOGY | Facility: HOSPITAL | Age: 23
Discharge: HOME | End: 2023-10-24
Attending: NURSE PRACTITIONER
Payer: COMMERCIAL

## 2023-10-24 DIAGNOSIS — R25.1 TREMOR: ICD-10-CM

## 2023-10-24 LAB
COPPER SERPL-MCNC: 1638 UG/L (ref 810–1990)
METHYLMALONATE SERPL-SCNC: 0.13 UMOL/L

## 2023-10-24 PROCEDURE — A9585 GADOBUTROL INJECTION: HCPCS | Performed by: NURSE PRACTITIONER

## 2023-10-24 PROCEDURE — 70553 MRI BRAIN STEM W/O & W/DYE: CPT | Mod: 26,,, | Performed by: RADIOLOGY

## 2023-10-24 PROCEDURE — 25500020 PHARM REV CODE 255: Performed by: NURSE PRACTITIONER

## 2023-10-24 PROCEDURE — 70553 MRI BRAIN STEM W/O & W/DYE: CPT | Mod: TC

## 2023-10-24 PROCEDURE — 70553 MRI BRAIN W WO CONTRAST: ICD-10-PCS | Mod: 26,,, | Performed by: RADIOLOGY

## 2023-10-24 RX ORDER — GADOBUTROL 604.72 MG/ML
5 INJECTION INTRAVENOUS
Status: COMPLETED | OUTPATIENT
Start: 2023-10-24 | End: 2023-10-24

## 2023-10-24 RX ADMIN — GADOBUTROL 5 ML: 604.72 INJECTION INTRAVENOUS at 07:10

## 2023-10-27 DIAGNOSIS — R89.9 ABNORMAL LABORATORY TEST: Primary | ICD-10-CM

## 2023-10-30 ENCOUNTER — HOSPITAL ENCOUNTER (OUTPATIENT)
Dept: RADIOLOGY | Facility: HOSPITAL | Age: 23
Discharge: HOME | End: 2023-10-30
Payer: COMMERCIAL

## 2023-10-30 ENCOUNTER — OFFICE VISIT (OUTPATIENT)
Dept: DERMATOLOGY | Facility: CLINIC | Age: 23
End: 2023-10-30
Payer: COMMERCIAL

## 2023-10-30 DIAGNOSIS — L81.0 POST-INFLAMMATORY HYPERPIGMENTATION: ICD-10-CM

## 2023-10-30 DIAGNOSIS — L90.6 STRIAE: ICD-10-CM

## 2023-10-30 DIAGNOSIS — R10.30 LOWER ABDOMINAL PAIN: ICD-10-CM

## 2023-10-30 DIAGNOSIS — L73.8 PSEUDOFOLLICULITIS: Primary | ICD-10-CM

## 2023-10-30 PROCEDURE — 1159F PR MEDICATION LIST DOCUMENTED IN MEDICAL RECORD: ICD-10-PCS | Mod: CPTII,S$GLB,, | Performed by: DERMATOLOGY

## 2023-10-30 PROCEDURE — 1159F MED LIST DOCD IN RCRD: CPT | Mod: CPTII,S$GLB,, | Performed by: DERMATOLOGY

## 2023-10-30 PROCEDURE — A9698 NON-RAD CONTRAST MATERIALNOC: HCPCS | Mod: PO

## 2023-10-30 PROCEDURE — 74177 CT ABD & PELVIS W/CONTRAST: CPT | Mod: TC,PO

## 2023-10-30 PROCEDURE — 99999 PR PBB SHADOW E&M-EST. PATIENT-LVL II: ICD-10-PCS | Mod: PBBFAC,,, | Performed by: DERMATOLOGY

## 2023-10-30 PROCEDURE — 99213 OFFICE O/P EST LOW 20 MIN: CPT | Mod: S$GLB,,, | Performed by: DERMATOLOGY

## 2023-10-30 PROCEDURE — 25500020 PHARM REV CODE 255: Mod: PO

## 2023-10-30 PROCEDURE — 99999 PR PBB SHADOW E&M-EST. PATIENT-LVL II: CPT | Mod: PBBFAC,,, | Performed by: DERMATOLOGY

## 2023-10-30 PROCEDURE — 74177 CT ABD & PELVIS W/CONTRAST: CPT | Mod: 26,,, | Performed by: RADIOLOGY

## 2023-10-30 PROCEDURE — 74177 CT ABDOMEN PELVIS WITH CONTRAST: ICD-10-PCS | Mod: 26,,, | Performed by: RADIOLOGY

## 2023-10-30 PROCEDURE — 99213 PR OFFICE/OUTPT VISIT, EST, LEVL III, 20-29 MIN: ICD-10-PCS | Mod: S$GLB,,, | Performed by: DERMATOLOGY

## 2023-10-30 RX ADMIN — IOHEXOL 75 ML: 350 INJECTION, SOLUTION INTRAVENOUS at 03:10

## 2023-10-30 RX ADMIN — IOHEXOL 1000 ML: 9 SOLUTION ORAL at 03:10

## 2023-10-30 NOTE — PROGRESS NOTES
Subjective:      Patient ID:  Devan Selby is a 23 y.o. female who presents for No chief complaint on file.    HPI     Established patient.   Initially scheduled appt for wart at hand but has resolved   C/o stretch marks, hyperpigmentation at abdomen from ingrown hairs.   No further complaints today.      Review of Systems    Objective:   Physical Exam   Constitutional: She appears well-developed and well-nourished. No distress.   Neurological: She is alert and oriented to person, place, and time. She is not disoriented.   Psychiatric: She has a normal mood and affect.   Skin:               Diagram Legend     Erythematous scaling macule/papule c/w actinic keratosis       Vascular papule c/w angioma      Pigmented verrucoid papule/plaque c/w seborrheic keratosis      Yellow umbilicated papule c/w sebaceous hyperplasia      Irregularly shaped tan macule c/w lentigo     1-2 mm smooth white papules consistent with Milia      Movable subcutaneous cyst with punctum c/w epidermal inclusion cyst      Subcutaneous movable cyst c/w pilar cyst      Firm pink to brown papule c/w dermatofibroma      Pedunculated fleshy papule(s) c/w skin tag(s)      Evenly pigmented macule c/w junctional nevus     Mildly variegated pigmented, slightly irregular-bordered macule c/w mildly atypical nevus      Flesh colored to evenly pigmented papule c/w intradermal nevus       Pink pearly papule/plaque c/w basal cell carcinoma      Erythematous hyperkeratotic cursted plaque c/w SCC      Surgical scar with no sign of skin cancer recurrence      Open and closed comedones      Inflammatory papules and pustules      Verrucoid papule consistent consistent with wart     Erythematous eczematous patches and plaques     Dystrophic onycholytic nail with subungual debris c/w onychomycosis     Umbilicated papule    Erythematous-base heme-crusted tan verrucoid plaque consistent with inflamed seborrheic keratosis     Erythematous Silvery Scaling  Plaque c/w Psoriasis     See annotation      Assessment / Plan:        Pseudofolliculitis  Post-inflammatory hyperpigmentation  - Discussed diagnosis, etiology, and treatment options.  - Discussed grooming habits to minimize in grown hairs (trimming vs shaving / waxing).   - Consideration for [facility]; recommended consultation with KK - chart copied to her staff for scheduling.   - Rec'd OTC body moisturizer with retinol.   - Discoloration will fade with time, photoprotect.   - Counseled on potential SE of medication(s) and instructed on use.     Striae  - Discussed diagnosis, etiology, and treatment options.   - Rec'd OTC body moisturizer with retinol.   - Counseled on potential SE of medication(s) and instructed on use.            Follow up if symptoms worsen or fail to improve.

## 2023-10-31 ENCOUNTER — TELEPHONE (OUTPATIENT)
Dept: NEUROLOGY | Facility: CLINIC | Age: 23
End: 2023-10-31
Payer: COMMERCIAL

## 2023-10-31 ENCOUNTER — PATIENT MESSAGE (OUTPATIENT)
Dept: GASTROENTEROLOGY | Facility: CLINIC | Age: 23
End: 2023-10-31
Payer: COMMERCIAL

## 2023-10-31 DIAGNOSIS — K59.00 CONSTIPATION, UNSPECIFIED CONSTIPATION TYPE: Primary | ICD-10-CM

## 2023-10-31 DIAGNOSIS — R11.0 NAUSEA: ICD-10-CM

## 2023-10-31 DIAGNOSIS — N28.9 RENAL LESION: ICD-10-CM

## 2023-10-31 RX ORDER — POLYETHYLENE GLYCOL 3350 17 G/17G
17 POWDER, FOR SOLUTION ORAL DAILY
Qty: 90 EACH | Refills: 0 | Status: SHIPPED | OUTPATIENT
Start: 2023-10-31 | End: 2024-01-29

## 2023-10-31 RX ORDER — ONDANSETRON 4 MG/1
4 TABLET, ORALLY DISINTEGRATING ORAL EVERY 12 HOURS PRN
Qty: 60 TABLET | Refills: 0 | Status: SHIPPED | OUTPATIENT
Start: 2023-10-31

## 2023-11-01 ENCOUNTER — TELEPHONE (OUTPATIENT)
Dept: DERMATOLOGY | Facility: CLINIC | Age: 23
End: 2023-11-01
Payer: COMMERCIAL

## 2023-11-01 NOTE — TELEPHONE ENCOUNTER
----- Message from Luiza Hawthorne MD sent at 10/30/2023  4:18 PM CDT -----  [facility] referral - lower abdomen in skin of color. Thanks!

## 2023-11-02 ENCOUNTER — PATIENT MESSAGE (OUTPATIENT)
Dept: HEMATOLOGY/ONCOLOGY | Facility: CLINIC | Age: 23
End: 2023-11-02

## 2023-11-02 ENCOUNTER — OFFICE VISIT (OUTPATIENT)
Dept: HEMATOLOGY/ONCOLOGY | Facility: CLINIC | Age: 23
End: 2023-11-02
Payer: COMMERCIAL

## 2023-11-02 DIAGNOSIS — E61.1 IRON DEFICIENCY: Primary | ICD-10-CM

## 2023-11-02 PROCEDURE — 1159F MED LIST DOCD IN RCRD: CPT | Mod: CPTII,95,, | Performed by: INTERNAL MEDICINE

## 2023-11-02 PROCEDURE — 99204 OFFICE O/P NEW MOD 45 MIN: CPT | Mod: 95,,, | Performed by: INTERNAL MEDICINE

## 2023-11-02 PROCEDURE — 1159F PR MEDICATION LIST DOCUMENTED IN MEDICAL RECORD: ICD-10-PCS | Mod: CPTII,95,, | Performed by: INTERNAL MEDICINE

## 2023-11-02 PROCEDURE — 1160F PR REVIEW ALL MEDS BY PRESCRIBER/CLIN PHARMACIST DOCUMENTED: ICD-10-PCS | Mod: CPTII,95,, | Performed by: INTERNAL MEDICINE

## 2023-11-02 PROCEDURE — 99204 PR OFFICE/OUTPT VISIT, NEW, LEVL IV, 45-59 MIN: ICD-10-PCS | Mod: 95,,, | Performed by: INTERNAL MEDICINE

## 2023-11-02 PROCEDURE — 1160F RVW MEDS BY RX/DR IN RCRD: CPT | Mod: CPTII,95,, | Performed by: INTERNAL MEDICINE

## 2023-11-02 RX ORDER — FERROUS SULFATE 325(65) MG
325 TABLET ORAL DAILY
Qty: 30 TABLET | Refills: 3 | Status: SHIPPED | OUTPATIENT
Start: 2023-11-02 | End: 2024-11-01

## 2023-11-02 NOTE — PROGRESS NOTES
The patient location is: home  Visit type: Virtual visit with synchronous audio and video  Face-to-face or time spent with patient on the encounter: 25 min  Total time spent on and for  this encounter which includes non face-to-face time preparing to see patient, review of tests, obtaining and or reviewing separately obtained records documenting clinical information in the electronic or other health records, independently interpreting results which is not separately reported ,and communicating results to the patient/family/caregiver and in care coordination and treatment planning/communicating with pharmacy for prescriptions/addressing social needs/arranging follow-up and or referrals : 25 min     Each patient I provide medical services by telemedicine is:  (1) informed of the relationship between the physician and patient and the respective role of any other health care provider with respect to management of the patient; and (2) notified that he or she may decline to receive medical services by telemedicine and may withdraw from such care at any time.  This is a video visit therefore some elements of the physical exam such as vital signs, heart sounds are breath sounds are not included and may be included if found in recent clinic notes of other providers assessing same patient. Any symptoms or signs that were visualized were stated by the patient may be included in this note.      Service Date:  11/2/23    Chief Complaint:  Iron Deficiency    Devan Selby is a 23 y.o. female referred to me with iron deficiency.  Patient also has anemia.  Has had GI workup, pending video endoscopy.  Has a history of H. pylori.  Has PICA.    Review of Systems   Constitutional: Negative.    HENT: Negative.     Eyes: Negative.    Respiratory: Negative.     Cardiovascular: Negative.    Gastrointestinal: Negative.    Endocrine: Negative.    Genitourinary: Negative.    Musculoskeletal: Negative.    Integumentary:  Negative.    Neurological: Negative.    Hematological: Negative.    Psychiatric/Behavioral: Negative.          Current Outpatient Medications   Medication Instructions    ferrous sulfate (FEOSOL) 325 mg, Oral, Daily    JUNEL FE 1.5/30, 28, 1.5 mg-30 mcg (21)/75 mg (7) tablet 1 tablet, Oral    ondansetron (ZOFRAN-ODT) 4 mg, Oral, Every 12 hours PRN    polyethylene glycol (GLYCOLAX) 17 g, Oral, Daily        History reviewed. No pertinent past medical history.     Past Surgical History:   Procedure Laterality Date    COLONOSCOPY N/A 10/17/2023    Procedure: COLONOSCOPY;  Surgeon: Coleman Paredes MD;  Location: [facility];  Service: Endoscopy;  Laterality: N/A;    ESOPHAGOGASTRODUODENOSCOPY N/A 2/18/2022    Procedure: EGD (ESOPHAGOGASTRODUODENOSCOPY);  Surgeon: Coleman Paredes MD;  Location: [facility];  Service: Endoscopy;  Laterality: N/A;    ESOPHAGOGASTRODUODENOSCOPY N/A 10/17/2023    Procedure: EGD (ESOPHAGOGASTRODUODENOSCOPY);  Surgeon: Coleman Paredes MD;  Location: [facility];  Service: Endoscopy;  Laterality: N/A;    UPPER GASTROINTESTINAL ENDOSCOPY          Family History   Problem Relation Age of Onset    Colon cancer Neg Hx     Colon polyps Neg Hx     Crohn's disease Neg Hx     Ulcerative colitis Neg Hx     Stomach cancer Neg Hx     Rectal cancer Neg Hx        Social History     Tobacco Use    Smoking status: Never    Smokeless tobacco: Never   Substance Use Topics    Alcohol use: Yes     Comment: occ    Drug use: No         There were no vitals filed for this visit.     Physical Exam:  There were no vitals taken for this visit.    Physical Exam  Constitutional:       Appearance: Normal appearance.   HENT:      Head: Normocephalic and atraumatic.      Nose: Nose normal.      Mouth/Throat:      Mouth: Mucous membranes are moist.      Pharynx: Oropharynx is clear.   Eyes:      Conjunctiva/sclera: Conjunctivae normal.   Cardiovascular:      Rate and Rhythm: Normal rate and regular rhythm.      Heart sounds: Normal  heart sounds.   Pulmonary:      Effort: Pulmonary effort is normal.      Breath sounds: Normal breath sounds.   Abdominal:      General: Abdomen is flat. Bowel sounds are normal.      Palpations: Abdomen is soft.   Musculoskeletal:         General: Normal range of motion.      Cervical back: Normal range of motion and neck supple.   Skin:     General: Skin is warm and dry.   Neurological:      General: No focal deficit present.      Mental Status: She is alert and oriented to person, place, and time. Mental status is at baseline.   Psychiatric:         Mood and Affect: Mood normal.          Labs:  Lab Results   Component Value Date    WBC 5.12 09/02/2023    RBC 4.11 09/02/2023    HGB 10.6 (L) 09/02/2023    HCT 34.9 (L) 09/02/2023    MCV 85 09/02/2023    MCH 25.8 (L) 09/02/2023    MCHC 30.4 (L) 09/02/2023    RDW 13.5 09/02/2023     09/02/2023    MPV 9.5 09/02/2023    GRAN 2.4 09/02/2023    GRAN 46.8 09/02/2023    LYMPH 2.1 09/02/2023    LYMPH 41.0 09/02/2023    MONO 0.5 09/02/2023    MONO 9.4 09/02/2023    EOS 0.1 09/02/2023    BASO 0.03 09/02/2023    EOSINOPHIL 1.8 09/02/2023    BASOPHIL 0.6 09/02/2023     Sodium   Date Value Ref Range Status   09/02/2023 138 136 - 145 mmol/L Final     Potassium   Date Value Ref Range Status   09/02/2023 4.1 3.5 - 5.1 mmol/L Final     Chloride   Date Value Ref Range Status   09/02/2023 107 95 - 110 mmol/L Final     CO2   Date Value Ref Range Status   09/02/2023 21 (L) 23 - 29 mmol/L Final     Glucose   Date Value Ref Range Status   09/02/2023 75 70 - 110 mg/dL Final     BUN   Date Value Ref Range Status   09/02/2023 13 6 - 20 mg/dL Final     Creatinine   Date Value Ref Range Status   10/19/2023 0.9 0.5 - 1.4 mg/dL Final     Calcium   Date Value Ref Range Status   09/02/2023 8.9 8.7 - 10.5 mg/dL Final     Total Protein   Date Value Ref Range Status   09/02/2023 7.0 6.0 - 8.4 g/dL Final     Albumin   Date Value Ref Range Status   09/02/2023 3.5 3.5 - 5.2 g/dL Final     Total  Bilirubin   Date Value Ref Range Status   09/02/2023 0.4 0.1 - 1.0 mg/dL Final     Comment:     For infants and newborns, interpretation of results should be based  on gestational age, weight and in agreement with clinical  observations.    Premature Infant recommended reference ranges:  Up to 24 hours.............<8.0 mg/dL  Up to 48 hours............<12.0 mg/dL  3-5 days..................<15.0 mg/dL  6-29 days.................<15.0 mg/dL       Alkaline Phosphatase   Date Value Ref Range Status   09/02/2023 36 (L) 55 - 135 U/L Final     AST   Date Value Ref Range Status   09/02/2023 21 10 - 40 U/L Final     ALT   Date Value Ref Range Status   09/02/2023 17 10 - 44 U/L Final     Anion Gap   Date Value Ref Range Status   09/02/2023 10 8 - 16 mmol/L Final     eGFR if    Date Value Ref Range Status   01/28/2022 >60.0 >60 mL/min/1.73 m^2 Final     eGFR if non    Date Value Ref Range Status   01/28/2022 >60.0 >60 mL/min/1.73 m^2 Final     Comment:     Calculation used to obtain the estimated glomerular filtration  rate (eGFR) is the CKD-EPI equation.          A/P:    Iron deficiency  -restart oral iron  -if intolerant, then will get IV iron  -RTC in 3 months with labs      Aurash Khoobehi, MD  Hematology and Oncology

## 2023-11-03 ENCOUNTER — HOSPITAL ENCOUNTER (OUTPATIENT)
Facility: HOSPITAL | Age: 23
Discharge: HOME | End: 2023-11-03
Attending: INTERNAL MEDICINE | Admitting: INTERNAL MEDICINE
Payer: COMMERCIAL

## 2023-11-03 VITALS
TEMPERATURE: 99 F | HEART RATE: 72 BPM | SYSTOLIC BLOOD PRESSURE: 128 MMHG | DIASTOLIC BLOOD PRESSURE: 86 MMHG | RESPIRATION RATE: 16 BRPM | OXYGEN SATURATION: 100 %

## 2023-11-03 DIAGNOSIS — D50.9 IRON DEFICIENCY ANEMIA: ICD-10-CM

## 2023-11-03 PROCEDURE — 91110 GI TRC IMG INTRAL ESOPH-ILE: CPT | Mod: TC | Performed by: INTERNAL MEDICINE

## 2023-11-03 PROCEDURE — 25000003 PHARM REV CODE 250: Performed by: INTERNAL MEDICINE

## 2023-11-03 RX ORDER — DEXTROMETHORPHAN/PSEUDOEPHED 2.5-7.5/.8
DROPS ORAL
Status: DISCONTINUED
Start: 2023-11-03 | End: 2023-11-03 | Stop reason: HOSPADM

## 2023-11-03 RX ORDER — DEXTROMETHORPHAN/PSEUDOEPHED 2.5-7.5/.8
40 DROPS ORAL ONCE
Status: COMPLETED | OUTPATIENT
Start: 2023-11-03 | End: 2023-11-03

## 2023-11-03 RX ADMIN — SIMETHICONE 40 MG: 20 SUSPENSION/ DROPS ORAL at 07:11

## 2023-11-03 NOTE — DISCHARGE INSTRUCTIONS
Capsule Discharge Instructions     You have just swallowed a capsule endoscope.  This contains information about what to expect over the next 8 hours.  Please call our office if you have severe or persistent abdominal or chest pain, fever, difficulty swallowing or if you have any questions.  Our phone number is [phone].    Time Capsule ingested:_______________    You may drink clear liquids (water, apple juice) 4 hours after swallowing the capsule.  You may eat a light meal 6 hours after swallowing the capsule.  Medications may be resumed at 6 hours after swallowing the capsule.  Do not exercise, avoid heavy lifting.  You may walk, sit and lay down.  You can drive a car.  You may return to work, if you work allows avoiding unsuitable environments and /or physical movements.  Avoid going near MRI machines and radio transmitters.  You may use a computer, cell phone, radio or stereo.  Do not stand directly next to another person undergoing capsule endoscopy.  Try not to touch the recorder or the sensor array leads.  Do not remove the leads before 8 hours.  Avoid getting the data recorder or sensor array leads wet.  You may loosen the belt to allow yourself to go to the bathroom.  Do not take the belt off until the 8 hours have passed.  Observe the LED light on the data recorder at least every 15 minutes.  If the light stops blinking, document the time and call our office.  Return the unit to the hospital at the completion of 8 hour time frame.    May have clear liquids at ______________    May have light snack and medications at ______________    May remove the unit at ______________    Return the unit to Registration Desk at the completion of the study.    Post Capsule Instructions     This information is what to expect over the next 2 days.  Please call us or your doctor if you have severe or persistent abdominal or chest pain, fever, difficulty swallowing, or if you have any questions.  Our phone number is  [phone].    Pain:  Pain is uncommon following capsule endoscopy.  Should you feel sharp or persistent pain, please call your doctor's office.  Nausea:  Nausea is also very uncommon and should it occur, please notify your doctor's office  Diet:  You may eat and drink with no restrictions 8 hours after ingesting capsule.  Activities:  Following the exam you may resume normal activities, including exercise.  Medications:  You may resume your medications 6 hours after ingesting the capsule.  Do NOT make up doses you have missed, just resume your normal dosage.  Further Testing:  Until the capsule passes, further testing which includes any type of MRI should be avoided.  If you have any type of MRI examination scheduled in the next 3 days, it should be postponed.  The Capsule:  The capsule passes naturally in a bowel movement typically in about 24 hours.  Most likely, you will be unaware of its passage.  It does not need to be retrieved and can safely be flushed down the toilet.  Occasionally the capsule light will still be flashing when it passes.  Should you be concerned that the capsule didn't pass, in the absence of symptoms; an abdominal x-ray can be obtained after 7 days to confirm its passage.  The  will make a beeping noise when finished.  It will shut off automatically.

## 2023-11-04 ENCOUNTER — HOSPITAL ENCOUNTER (OUTPATIENT)
Dept: RADIOLOGY | Facility: HOSPITAL | Age: 23
Discharge: HOME | End: 2023-11-04
Payer: COMMERCIAL

## 2023-11-04 DIAGNOSIS — N28.9 RENAL LESION: ICD-10-CM

## 2023-11-04 PROCEDURE — 76770 US EXAM ABDO BACK WALL COMP: CPT | Mod: TC

## 2023-11-06 PROCEDURE — 91110 PR GI TRACT CAPSULE ENDOSCOPY: ICD-10-PCS | Mod: 26,,, | Performed by: INTERNAL MEDICINE

## 2023-11-06 PROCEDURE — 91110 GI TRC IMG INTRAL ESOPH-ILE: CPT | Mod: 26,,, | Performed by: INTERNAL MEDICINE

## 2023-11-06 NOTE — PROVATION PATIENT INSTRUCTIONS
Discharge Summary/Instructions after an Endoscopic Procedure  Patient Name: Devan Selby  Patient MRN: [id number]  Patient YOB: 2000  Friday, November 3, 2023  Coleman Escoto MD  Dear patient,  As a result of recent federal legislation (The Federal Cures Act), you may   receive lab or pathology results from your procedure in your [facility]   account before your physician is able to contact you. Your physician or   their representative will relay the results to you with their   recommendations at their soonest availability.  Thank you,  RESTRICTIONS:  During your procedure today, you received medications for sedation.  These   medications may affect your judgment, balance and coordination.  Therefore,   for 24 hours, you have the following restrictions:   - DO NOT drive a car, operate machinery, make legal/financial decisions,   sign important papers or drink alcohol.    ACTIVITY:  Today: no heavy lifting, straining or running due to procedural   sedation/anesthesia.  The following day: return to full activity including work.  DIET:  Eat and drink normally unless instructed otherwise.     TREATMENT FOR COMMON SIDE EFFECTS:  - Mild abdominal pain, nausea, belching, bloating or excessive gas:  rest,   eat lightly and use a heating pad.  - Sore Throat: treat with throat lozenges and/or gargle with warm salt   water.  - Because air was used during the procedure, expelling large amounts of air   from your rectum or belching is normal.  - If a bowel prep was taken, you may not have a bowel movement for 1-3 days.    This is normal.  SYMPTOMS TO WATCH FOR AND REPORT TO YOUR PHYSICIAN:  1. Abdominal pain or bloating, other than gas cramps.  2. Chest pain.  3. Back pain.  4. Signs of infection such as: chills or fever occurring within 24 hours   after the procedure.  5. Rectal bleeding, which would show as bright red, maroon, or black stools.   (A tablespoon of blood from the rectum is not serious, especially  if   hemorrhoids are present.)  6. Vomiting.  7. Weakness or dizziness.  GO DIRECTLY TO THE NEAREST EMERGENCY ROOM IF YOU HAVE ANY OF THE FOLLOWING:      Difficulty breathing              Chills and/or fever over 101 F   Persistent vomiting and/or vomiting blood   Severe abdominal pain   Severe chest pain   Black, tarry stools   Bleeding- more than one tablespoon   Any other symptom or condition that you feel may need urgent attention  Your doctor recommends these additional instructions:  If any biopsies were taken, your doctors clinic will contact you in 1 to 2   weeks with any results.  1.  Iron supplementation PRN  2.  No further GI endoscopy at this time unless overt bleeding occurs  3.  Follow up with PCP  For questions, problems or results please call your physician - Coleman Escoto MD at Work:  [phone].  [facility], EMERGENCY ROOM PHONE NUMBER: [phone]  IF A COMPLICATION OR EMERGENCY SITUATION ARISES AND YOU ARE UNABLE TO REACH   YOUR PHYSICIAN - GO DIRECTLY TO THE EMERGENCY ROOM.  Coleman Escoto MD  11/6/2023 4:01:13 PM  This report has been verified and signed electronically.  Dear patient,  As a result of recent federal legislation (The Federal Cures Act), you may   receive lab or pathology results from your procedure in your [facility]   account before your physician is able to contact you. Your physician or   their representative will relay the results to you with their   recommendations at their soonest availability.  Thank you,  PROVATION

## 2023-11-09 ENCOUNTER — OFFICE VISIT (OUTPATIENT)
Dept: NEUROLOGY | Facility: CLINIC | Age: 23
End: 2023-11-09
Payer: COMMERCIAL

## 2023-11-09 DIAGNOSIS — R25.1 TREMOR: ICD-10-CM

## 2023-11-09 DIAGNOSIS — R25.9 ABNORMAL INVOLUNTARY MOVEMENT: ICD-10-CM

## 2023-11-09 PROCEDURE — 99213 OFFICE O/P EST LOW 20 MIN: CPT | Mod: 95,,, | Performed by: NURSE PRACTITIONER

## 2023-11-09 PROCEDURE — 99213 PR OFFICE/OUTPT VISIT, EST, LEVL III, 20-29 MIN: ICD-10-PCS | Mod: 95,,, | Performed by: NURSE PRACTITIONER

## 2023-11-09 NOTE — ASSESSMENT & PLAN NOTE
Serologies, MRI brain unremarkable   Tremor improved per report  Previously suspected that this was a postural tremor (benign) vs Rx induced tremor from PPI given onset

## 2023-11-09 NOTE — PROGRESS NOTES
NEUROLOGY  Outpatient Visit     [facility]  [address] [facility] [address], [place] [address]  [phone] (office) / [phone] (fax)    Patient Name:  Devan Selby  :  2000  MR #:  [id number]  Acct #:  [id number]    Date of  Visit: 2023    Other Physicians:  Ha Baires MD (Primary Care Physician)    The patient location is: [place], [place]  The chief complaint leading to consultation is: tremor    Visit type: audiovisual    Each patient to whom he or she provides medical services by telemedicine is:  (1) informed of the relationship between the physician and patient and the respective role of any other health care provider with respect to management of the patient; and (2) notified that he or she may decline to receive medical services by telemedicine and may withdraw from such care at any time.    Interval history:  23:  Devan Selby is a 23 y.o. R-handed female seen in f/u for tremor and head tic.     Medical history is significant for Fe def anemia, H pylori infection    Has not noted much tremor since last visit.     No longer taking PPI. Also recalls that she started Remeron around the same time that she noted the movements.Took single dose only, though.     Started taking Fe supplement.     No neck complaints.     HISTORY OF PRESENT ILLNESS:  Devan Selby is a 23 y.o. R-handed female seen in consultation for tremor per No ref. provider found    Medical history is significant for Fe def anemia, H pylori infection    Noted tremor in the R hand approx 9-10 months ago. Intermittent. No clear trigger, such as actions or postures. No relieving factors. Onset was shortly after starting PPI, so stopped taking it but tremor unchanged. Tremor has occurred at rest and with action. Has video of her laying in bed with the RUE outstretched with high freq low amp tremor. Has another video of her operating computer mouse and the R thumb tremoring. When she has  the tremor, she notices paresthesias in her fingertips that only last for a matter of seconds.     Also describes a head tic. This occurs as a separate phenomenon from the hand tremor. Describes that her head will subtly jerk to the left randomly. Not an exaggerated movement. Also, has noted a pulling sensation in the lower lip to the R side (not associated with the head jerk though). She feels like she has a lot of tension in her neck.     Denies vocal tremor or facial tremor. Reports that she has had a RLE tremor intermittently since childhood. This is mostly when seated with active plantar flexion of the foot. She can make it stop by resting or moving her leg.      No gait change or other involuntary movements elsewhere.      Not aware of any FH of tremor or movement disorder.     Seeing GI for anemia evaluation. Recent labs showed Fe deficiency. She hasn't started a supplement yet. She is having EGD and colonoscopy today.     Recently graduated from [employer]. Working as medical assistant at [employer]. Lives in [place] with parents, has well water supply.     Denies routine ETOH or drug use. Non smoker.     Allergies:  Review of patient's allergies indicates:  No Known Allergies    Current Medications:  Current Outpatient Medications   Medication Sig Dispense Refill    ferrous sulfate (FEOSOL) 325 mg (65 mg iron) Tab tablet Take 1 tablet (325 mg total) by mouth once daily. 30 tablet 3    JUNEL FE 1.5/30, 28, 1.5 mg-30 mcg (21)/75 mg (7) tablet Take 1 tablet by mouth.      ondansetron (ZOFRAN-ODT) 4 MG TbDL Take 1 tablet (4 mg total) by mouth every 12 (twelve) hours as needed (nausea). 60 tablet 0    polyethylene glycol (GLYCOLAX) 17 gram PwPk Take 17 g by mouth once daily. 90 each 0     No current facility-administered medications for this visit.       Past Medical History:  No past medical history on file.    Past Surgical History:  Past Surgical History:   Procedure Laterality Date    COLONOSCOPY N/A 10/17/2023     Procedure: COLONOSCOPY;  Surgeon: Coleman Paredes MD;  Location: [facility];  Service: Endoscopy;  Laterality: N/A;    ESOPHAGOGASTRODUODENOSCOPY N/A 2/18/2022    Procedure: EGD (ESOPHAGOGASTRODUODENOSCOPY);  Surgeon: Coleman Paredes MD;  Location: [facility] ENDO;  Service: Endoscopy;  Laterality: N/A;    ESOPHAGOGASTRODUODENOSCOPY N/A 10/17/2023    Procedure: EGD (ESOPHAGOGASTRODUODENOSCOPY);  Surgeon: Coleman Paredes MD;  Location: [facility];  Service: Endoscopy;  Laterality: N/A;    INTRALUMINAL GASTROINTESTINAL TRACT IMAGING VIA CAPSULE N/A 11/3/2023    Procedure: IMAGING PROCEDURE, GI TRACT, INTRALUMINAL, VIA CAPSULE;  Surgeon: Coleman Paredes MD;  Location: [facility];  Service: Endoscopy;  Laterality: N/A;    UPPER GASTROINTESTINAL ENDOSCOPY         Family History:  family history is not on file.    Social History:   reports that she has never smoked. She has never used smokeless tobacco. She reports current alcohol use. She reports that she does not use drugs.      REVIEW OF SYSTEMS:  As per HPI    PHYSICAL EXAM:  LMP 11/03/2023     General: Well groomed. No acute distress.  Pulmonary: Normal effort and rate.     Neurological exam:  Mental status: Awake and alert.  Oriented to person, place, time and situation. Recent and remote memory appear to be intact.  Fund of knowledge normal. Normal attention and concentration.   Speech/Language: Fluent and appropriate. No dysarthria or aphasia on conversation.   Cranial nerves (II-XII): Face symmetric. Shoulder shrug normal bilaterally. Normal tongue protrusion.   Motor: No abnormal movements noted. No drift appreciated.   Sensation: reports intact to LT  DTR: JOLIE  Coordination: No obvious tremor  Gait: JOLIE    DIAGNOSTIC DATA:  I have personally reviewed provider notes, labs and imaging made available to me today.     Imaging:  MRI brain w wo 10/24/23:  FINDINGS:  Ventricles and sulci are normal in size for age without evidence of hydrocephalus.     The brain  "parenchyma appears within normal limits.   Diffusion-weighted images demonstrate no evidence of an acute infarct.   Susceptibility weighted images demonstrate no evidence of acute or chronic hemorrhage. No mass effect or midline shift.     No extra-axial blood or fluid collections.     No abnormal intracranial enhancement.     Normal vascular flow voids are preserved.     Bone marrow signal intensity is normal.  The paranasal sinuses are normal.  The visualized portions of the mastoids are unremarkable.  Orbits are unremarkable.     Impression:     No significant intracranial abnormality or abnormal enhancement.     Cardiac:  Na    Labs:  Lab Results   Component Value Date    WBC 5.12 09/02/2023    HGB 10.6 (L) 09/02/2023    HCT 34.9 (L) 09/02/2023     09/02/2023    MCV 85 09/02/2023    RDW 13.5 09/02/2023     Lab Results   Component Value Date     09/02/2023    K 4.1 09/02/2023     09/02/2023    CO2 21 (L) 09/02/2023    BUN 13 09/02/2023    CREATININE 0.9 10/19/2023    GLU 75 09/02/2023    CALCIUM 8.9 09/02/2023     Lab Results   Component Value Date    PROT 7.0 09/02/2023    ALBUMIN 3.5 09/02/2023    BILITOT 0.4 09/02/2023    AST 21 09/02/2023    ALKPHOS 36 (L) 09/02/2023    ALT 17 09/02/2023     No results found for: "INR", "PROTIME", "PTT"  Lab Results   Component Value Date    CHOL 156 09/02/2023    HDL 65 09/02/2023    LDLCALC 81.0 09/02/2023    TRIG 50 09/02/2023    CHOLHDL 41.7 09/02/2023     No results found for: "LABA1C", "HGBA1C"   Lab Results   Component Value Date    VPPNLPZP85 430 10/19/2023     No results found for: "FOLATE"  Lab Results   Component Value Date    TSH 0.699 09/02/2023     Component      Latest Ref Rng 9/12/2023   Iron      30 - 160 ug/dL 60    Transferrin      200 - 375 mg/dL 199 (L)    TIBC      250 - 450 ug/dL 279    Saturated Iron      20 - 50 % 22    Ferritin      20.0 - 300.0 ng/mL 5 (L)      Component      Latest Ref Rng 10/19/2023   Arsenic      <13 ng/mL <1  "   Lead      <5.0 mcg/dL <1.0    Cadmium      <5.0 ng/mL 0.2    Mercury      <10 ng/mL <1    Venous/Capillary Venous    Street Address Test Not Performed    City Test Not Performed    State Test Not Performed    Zip Test Not Performed    County Test Not Performed    Guardian First Name Test Not Performed    Guardian Last Name Test Not Performed    Home Phone Test Not Performed    Race Test Not Performed    Copper      810 - 1990 ug/L 1638    CERULOPLASMIN      15.0 - 45.0 mg/dL 36.0    Vitamin B-12      210 - 950 pg/mL 430    Methlymalonic Acid      <0.40 umol/L 0.13    RPR      Non-reactive  Non-reactive        ASSESSMENT & PLAN:  Devan Selby is a 23 y.o. R-handed female seen in f/u for tremor and head tic.     Problem List Items Addressed This Visit          Neuro    Tremor    Current Assessment & Plan     Serologies, MRI brain unremarkable   Tremor improved per report  Previously suspected that this was a postural tremor (benign) vs Rx induced tremor from PPI given onset         Abnormal involuntary movement    Current Assessment & Plan     Describes head tic and facial synkinesis although none noted on exam  MRI brain negative as above  Recommend evaluation with movement team -- pt defers at present, will monitor for any worsening and seek subspecialty care if needed then               Follow up: PRN, rec referral to movement team for head tic in the event of change / worsening     I spent a total of 20 minutes on the day of the visit.    This includes face to face time with the patient, as well as non-face to face time preparing for and completing the visit (review of prior diagnostic testing and clinical notes, obtaining or reviewing history, documenting clinical information in the EMR, independently interpreting and communicating results to the patient/family and coordinating ongoing care).       I appreciate the opportunity to participate in the care of this patient. Please feel free to contact me  with any concerns or questions.       Katie Banks, [facility]  [facility]  [address] [place] [address]

## 2023-11-09 NOTE — ASSESSMENT & PLAN NOTE
Describes head tic and facial synkinesis although none noted on exam  MRI brain negative as above  Recommend evaluation with movement team -- pt defers at present, will monitor for any worsening and seek subspecialty care if needed then

## 2023-11-17 NOTE — PLAN OF CARE
"IV removed,patient states "no pain" and ready to be discharged. Discharged home with belongings and family.   " 76

## 2024-01-08 ENCOUNTER — PATIENT MESSAGE (OUTPATIENT)
Dept: NEUROLOGY | Facility: CLINIC | Age: 24
End: 2024-01-08
Payer: COMMERCIAL

## 2024-01-31 ENCOUNTER — TELEPHONE (OUTPATIENT)
Dept: HEMATOLOGY/ONCOLOGY | Facility: CLINIC | Age: 24
End: 2024-01-31
Payer: COMMERCIAL

## 2024-01-31 ENCOUNTER — PATIENT MESSAGE (OUTPATIENT)
Dept: HEMATOLOGY/ONCOLOGY | Facility: CLINIC | Age: 24
End: 2024-01-31
Payer: COMMERCIAL

## 2024-11-26 NOTE — PROGRESS NOTES
Hyperlipidemia is chronic per patient history.  Level will be checked on labs, and they will continue current medication pending results.   Please advise patient that capsule study showed only a single small bowel angioectasia with no bleeding.  This is a small malformed blood vessel that may occasionally leak from time to time but does not appear significant.  No new recommendations at this time other than iron supplementation and follow up with PCP.

## 2025-03-25 ENCOUNTER — OFFICE VISIT (OUTPATIENT)
Dept: FAMILY MEDICINE | Facility: CLINIC | Age: 25
End: 2025-03-25
Payer: COMMERCIAL

## 2025-03-25 VITALS
HEART RATE: 70 BPM | DIASTOLIC BLOOD PRESSURE: 62 MMHG | OXYGEN SATURATION: 99 % | SYSTOLIC BLOOD PRESSURE: 124 MMHG | BODY MASS INDEX: 21.6 KG/M2 | WEIGHT: 133.81 LBS

## 2025-03-25 DIAGNOSIS — R61 EXCESSIVE SWEATING: ICD-10-CM

## 2025-03-25 DIAGNOSIS — R11.0 NAUSEA: ICD-10-CM

## 2025-03-25 DIAGNOSIS — Z13.6 SCREENING FOR CARDIOVASCULAR CONDITION: ICD-10-CM

## 2025-03-25 DIAGNOSIS — Z11.4 SCREENING FOR HIV (HUMAN IMMUNODEFICIENCY VIRUS): ICD-10-CM

## 2025-03-25 DIAGNOSIS — R53.83 OTHER FATIGUE: Primary | ICD-10-CM

## 2025-03-25 DIAGNOSIS — F41.9 ANXIETY: ICD-10-CM

## 2025-03-25 DIAGNOSIS — D50.8 OTHER IRON DEFICIENCY ANEMIA: ICD-10-CM

## 2025-03-25 DIAGNOSIS — Z11.59 ENCOUNTER FOR HEPATITIS C SCREENING TEST FOR LOW RISK PATIENT: ICD-10-CM

## 2025-03-25 PROCEDURE — 3078F DIAST BP <80 MM HG: CPT | Mod: CPTII,S$GLB,, | Performed by: FAMILY MEDICINE

## 2025-03-25 PROCEDURE — 99999 PR PBB SHADOW E&M-EST. PATIENT-LVL III: CPT | Mod: PBBFAC,,, | Performed by: FAMILY MEDICINE

## 2025-03-25 PROCEDURE — 1159F MED LIST DOCD IN RCRD: CPT | Mod: CPTII,S$GLB,, | Performed by: FAMILY MEDICINE

## 2025-03-25 PROCEDURE — 3074F SYST BP LT 130 MM HG: CPT | Mod: CPTII,S$GLB,, | Performed by: FAMILY MEDICINE

## 2025-03-25 PROCEDURE — 3008F BODY MASS INDEX DOCD: CPT | Mod: CPTII,S$GLB,, | Performed by: FAMILY MEDICINE

## 2025-03-25 PROCEDURE — 99214 OFFICE O/P EST MOD 30 MIN: CPT | Mod: S$GLB,,, | Performed by: FAMILY MEDICINE

## 2025-03-25 RX ORDER — DESOGESTREL AND ETHINYL ESTRADIOL AND ETHINYL ESTRADIOL 21-5 (28)
KIT ORAL
COMMUNITY

## 2025-03-25 RX ORDER — ONDANSETRON 4 MG/1
4 TABLET, ORALLY DISINTEGRATING ORAL EVERY 12 HOURS PRN
Qty: 30 TABLET | Refills: 2 | Status: SHIPPED | OUTPATIENT
Start: 2025-03-25

## 2025-03-25 NOTE — PROGRESS NOTES
Subjective:       Patient ID: Devan Selby is a 24 y.o. female.    Chief Complaint: Establish Care    HPI    The patient here today to establish a new primary care physician.  The patient complains of heavy cycles, currently taking oral contraception tablets, the patient stated that when she does not take the contraception, her periods are very heavy, she has anemia, the last blood count was 10.7, the ferritin levels were very low.  The patient currently is taking multivitamins.  She complains of excessive sweating and she feels very cold.  She also noticed unwanted hair on the chin area and an abdominal area and she is doing laser therapy for hair and is working well.  She ask her gynecologist if she maybe high PCOS because of those new symptoms.  She feels tired all the time, she feels anxious she was taking Prozac in the past but currently she is not taking medication.  She feels nauseated sometimes and she takes Zofran as needed which she needs refills today.  She exercise cardio and also doing weights.    Past medical history is significant for heavy cycles.  The patient currently seen the gynecologist.    Past social history: The patient does not smoke cigarettes, she drinks alcohol occasionally.    Past Family history: The patient has strong family history of diabetes and hypertension.    Past surgical history:  No surgeries but the patient had EGD and colonoscopy procedures secondary to the patient having stomach problems and had H pylori that was treated.    Past medical history, past social history, past Family history, past surgical history was reviewed discussed with the patient.    Review of Systems    Objective:      Physical Exam  Vitals and nursing note reviewed.   Constitutional:       General: She is not in acute distress.     Appearance: Normal appearance. She is well-developed and normal weight. She is not diaphoretic.   HENT:      Head: Normocephalic and atraumatic.      Right Ear:  External ear normal.      Left Ear: External ear normal.      Nose: Nose normal.   Eyes:      General: No scleral icterus.        Right eye: No discharge.         Left eye: No discharge.      Conjunctiva/sclera: Conjunctivae normal.      Pupils: Pupils are equal, round, and reactive to light.   Cardiovascular:      Rate and Rhythm: Normal rate and regular rhythm.      Heart sounds: Normal heart sounds.   Pulmonary:      Effort: Pulmonary effort is normal. No respiratory distress.      Breath sounds: Normal breath sounds. No wheezing.   Abdominal:      General: Abdomen is flat. Bowel sounds are normal. There is no distension.      Tenderness: There is no abdominal tenderness.   Musculoskeletal:         General: No tenderness.      Cervical back: Neck supple.   Skin:     Coloration: Skin is not pale.   Neurological:      Mental Status: She is alert.   Psychiatric:         Behavior: Behavior normal.         Thought Content: Thought content normal.         Judgment: Judgment normal.         Assessment:       1. Other fatigue    2. Screening for HIV (human immunodeficiency virus)    3. Encounter for hepatitis C screening test for low risk patient    4. Nausea    5. Anxiety    6. Excessive sweating    7. Screening for cardiovascular condition    8. Other iron deficiency anemia        Plan:       Other fatigue:  New problem workup needed  -     Comprehensive Metabolic Panel; Future; Expected date: 03/25/2025  -     TSH; Future; Expected date: 03/25/2025    Screening for HIV (human immunodeficiency virus)  -     HIV 1/2 Ag/Ab (4th Gen); Future; Expected date: 03/25/2025    Encounter for hepatitis C screening test for low risk patient  -     Hepatitis C Antibody; Future; Expected date: 03/25/2025    Nausea:  New problem workup needed  -     ondansetron (ZOFRAN-ODT) 4 MG TbDL; Take 1 tablet (4 mg total) by mouth every 12 (twelve) hours as needed (nausea).  Dispense: 30 tablet; Refill: 2    Anxiety:  New problem, workup  needed    Excessive sweating:  New problem workup needed    Screening for cardiovascular condition  -     Lipid Panel; Future; Expected date: 03/25/2025    Other iron deficiency anemia:  New problem workup needed  -     Iron and TIBC; Future; Expected date: 03/25/2025  -     Ferritin; Future; Expected date: 03/25/2025  -     CBC Without Differential; Future; Expected date: 03/25/2025        Medication was refill.  Will order blood work, will send a message when we have the results of the test, continue exercising.  The patient was advised to eat healthy, avoid fried foods red meat and processed starches, eat vegetables, more salads, drink plenty water.  Patient agreed with assessment and plan.  Patient verbalized understanding.

## 2025-04-01 ENCOUNTER — LAB VISIT (OUTPATIENT)
Dept: LAB | Facility: HOSPITAL | Age: 25
End: 2025-04-01
Attending: FAMILY MEDICINE
Payer: COMMERCIAL

## 2025-04-01 DIAGNOSIS — Z11.59 ENCOUNTER FOR HEPATITIS C SCREENING TEST FOR LOW RISK PATIENT: ICD-10-CM

## 2025-04-01 DIAGNOSIS — Z11.4 SCREENING FOR HIV (HUMAN IMMUNODEFICIENCY VIRUS): ICD-10-CM

## 2025-04-01 DIAGNOSIS — R53.83 OTHER FATIGUE: ICD-10-CM

## 2025-04-01 DIAGNOSIS — Z13.6 SCREENING FOR CARDIOVASCULAR CONDITION: ICD-10-CM

## 2025-04-01 DIAGNOSIS — D50.8 OTHER IRON DEFICIENCY ANEMIA: ICD-10-CM

## 2025-04-01 LAB
ALBUMIN SERPL BCP-MCNC: 3.4 G/DL (ref 3.5–5.2)
ALP SERPL-CCNC: 36 UNIT/L (ref 40–150)
ALT SERPL W/O P-5'-P-CCNC: 9 UNIT/L (ref 10–44)
ANION GAP (OHS): 9 MMOL/L (ref 8–16)
AST SERPL-CCNC: 17 UNIT/L (ref 11–45)
BILIRUB SERPL-MCNC: 0.4 MG/DL (ref 0.1–1)
BUN SERPL-MCNC: 11 MG/DL (ref 6–20)
CALCIUM SERPL-MCNC: 9 MG/DL (ref 8.7–10.5)
CHLORIDE SERPL-SCNC: 110 MMOL/L (ref 95–110)
CHOLEST SERPL-MCNC: 168 MG/DL (ref 120–199)
CHOLEST/HDLC SERPL: 2.7 {RATIO} (ref 2–5)
CO2 SERPL-SCNC: 20 MMOL/L (ref 23–29)
CREAT SERPL-MCNC: 0.8 MG/DL (ref 0.5–1.4)
ERYTHROCYTE [DISTWIDTH] IN BLOOD BY AUTOMATED COUNT: 11.7 % (ref 11.5–14.5)
FERRITIN SERPL-MCNC: 10 NG/ML (ref 20–300)
GFR SERPLBLD CREATININE-BSD FMLA CKD-EPI: >60 ML/MIN/1.73/M2
GLUCOSE SERPL-MCNC: 75 MG/DL (ref 70–110)
HCT VFR BLD AUTO: 37.4 % (ref 37–48.5)
HCV AB SERPL QL IA: NORMAL
HDLC SERPL-MCNC: 63 MG/DL (ref 40–75)
HDLC SERPL: 37.5 % (ref 20–50)
HGB BLD-MCNC: 12.3 GM/DL (ref 12–16)
HIV 1+2 AB+HIV1 P24 AG SERPL QL IA: NORMAL
IRON SATN MFR SERPL: 28 % (ref 20–50)
IRON SERPL-MCNC: 147 UG/DL (ref 30–160)
LDLC SERPL CALC-MCNC: 91.2 MG/DL (ref 63–159)
MCH RBC QN AUTO: 30 PG (ref 27–31)
MCHC RBC AUTO-ENTMCNC: 32.9 G/DL (ref 32–36)
MCV RBC AUTO: 91 FL (ref 82–98)
NONHDLC SERPL-MCNC: 105 MG/DL
PLATELET # BLD AUTO: 235 K/UL (ref 150–450)
PMV BLD AUTO: 10.6 FL (ref 9.2–12.9)
POTASSIUM SERPL-SCNC: 4.2 MMOL/L (ref 3.5–5.1)
PROT SERPL-MCNC: 6.7 GM/DL (ref 6–8.4)
RBC # BLD AUTO: 4.1 M/UL (ref 4–5.4)
SODIUM SERPL-SCNC: 139 MMOL/L (ref 136–145)
TIBC SERPL-MCNC: 525 UG/DL (ref 250–450)
TRANSFERRIN SERPL-MCNC: 355 MG/DL (ref 200–375)
TRIGL SERPL-MCNC: 69 MG/DL (ref 30–150)
TSH SERPL-ACNC: 1.75 UIU/ML (ref 0.4–4)
WBC # BLD AUTO: 5.11 K/UL (ref 3.9–12.7)

## 2025-04-01 PROCEDURE — 82728 ASSAY OF FERRITIN: CPT

## 2025-04-01 PROCEDURE — 80061 LIPID PANEL: CPT

## 2025-04-01 PROCEDURE — 87389 HIV-1 AG W/HIV-1&-2 AB AG IA: CPT

## 2025-04-01 PROCEDURE — 84443 ASSAY THYROID STIM HORMONE: CPT

## 2025-04-01 PROCEDURE — 86803 HEPATITIS C AB TEST: CPT

## 2025-04-01 PROCEDURE — 85027 COMPLETE CBC AUTOMATED: CPT

## 2025-04-01 PROCEDURE — 80053 COMPREHEN METABOLIC PANEL: CPT

## 2025-04-01 PROCEDURE — 83540 ASSAY OF IRON: CPT

## 2025-04-01 PROCEDURE — 36415 COLL VENOUS BLD VENIPUNCTURE: CPT | Mod: PO

## 2025-04-03 ENCOUNTER — RESULTS FOLLOW-UP (OUTPATIENT)
Dept: FAMILY MEDICINE | Facility: CLINIC | Age: 25
End: 2025-04-03

## 2025-04-03 DIAGNOSIS — E61.1 IRON DEFICIENCY: Primary | ICD-10-CM

## 2025-04-03 RX ORDER — FERROUS SULFATE 325(65) MG
325 TABLET ORAL EVERY OTHER DAY
Qty: 45 TABLET | Refills: 1 | Status: SHIPPED | OUTPATIENT
Start: 2025-04-03 | End: 2025-09-30